# Patient Record
Sex: MALE | Race: BLACK OR AFRICAN AMERICAN | NOT HISPANIC OR LATINO | Employment: OTHER | URBAN - METROPOLITAN AREA
[De-identification: names, ages, dates, MRNs, and addresses within clinical notes are randomized per-mention and may not be internally consistent; named-entity substitution may affect disease eponyms.]

---

## 2020-10-18 ENCOUNTER — APPOINTMENT (OUTPATIENT)
Dept: GENERAL RADIOLOGY | Facility: HOSPITAL | Age: 70
End: 2020-10-18

## 2020-10-18 ENCOUNTER — HOSPITAL ENCOUNTER (INPATIENT)
Facility: HOSPITAL | Age: 70
LOS: 3 days | Discharge: NURSING, REHAB OR LONG-TERM CARE | End: 2020-10-21
Attending: EMERGENCY MEDICINE | Admitting: INTERNAL MEDICINE

## 2020-10-18 DIAGNOSIS — I50.9 ACUTE CONGESTIVE HEART FAILURE, UNSPECIFIED HEART FAILURE TYPE (HCC): Primary | ICD-10-CM

## 2020-10-18 DIAGNOSIS — J96.01 ACUTE RESPIRATORY FAILURE WITH HYPOXIA (HCC): ICD-10-CM

## 2020-10-18 PROBLEM — R09.02 HYPOXIA: Status: ACTIVE | Noted: 2020-10-18

## 2020-10-18 PROBLEM — U07.1 PNEUMONIA DUE TO COVID-19 VIRUS: Status: ACTIVE | Noted: 2020-10-18

## 2020-10-18 PROBLEM — J12.82 PNEUMONIA DUE TO COVID-19 VIRUS: Status: ACTIVE | Noted: 2020-10-18

## 2020-10-18 LAB
ALBUMIN SERPL-MCNC: 4.3 G/DL (ref 3.5–5.2)
ALBUMIN/GLOB SERPL: 1.3 G/DL
ALP SERPL-CCNC: 153 U/L (ref 39–117)
ALT SERPL W P-5'-P-CCNC: 32 U/L (ref 1–41)
ANION GAP SERPL CALCULATED.3IONS-SCNC: 8.6 MMOL/L (ref 5–15)
AST SERPL-CCNC: 16 U/L (ref 1–40)
B PARAPERT DNA SPEC QL NAA+PROBE: NOT DETECTED
B PERT DNA SPEC QL NAA+PROBE: NOT DETECTED
BASOPHILS # BLD AUTO: 0.04 10*3/MM3 (ref 0–0.2)
BASOPHILS NFR BLD AUTO: 0.5 % (ref 0–1.5)
BILIRUB SERPL-MCNC: 1.1 MG/DL (ref 0–1.2)
BUN SERPL-MCNC: 20 MG/DL (ref 8–23)
BUN/CREAT SERPL: 20.4 (ref 7–25)
C PNEUM DNA NPH QL NAA+NON-PROBE: NOT DETECTED
CALCIUM SPEC-SCNC: 9.8 MG/DL (ref 8.6–10.5)
CHLORIDE SERPL-SCNC: 103 MMOL/L (ref 98–107)
CO2 SERPL-SCNC: 26.4 MMOL/L (ref 22–29)
CREAT SERPL-MCNC: 0.98 MG/DL (ref 0.76–1.27)
D DIMER PPP FEU-MCNC: 0.57 MCGFEU/ML (ref 0–0.49)
D-LACTATE SERPL-SCNC: 1.7 MMOL/L (ref 0.5–2)
DEPRECATED RDW RBC AUTO: 42.9 FL (ref 37–54)
EOSINOPHIL # BLD AUTO: 0.1 10*3/MM3 (ref 0–0.4)
EOSINOPHIL NFR BLD AUTO: 1.4 % (ref 0.3–6.2)
ERYTHROCYTE [DISTWIDTH] IN BLOOD BY AUTOMATED COUNT: 14 % (ref 12.3–15.4)
FERRITIN SERPL-MCNC: 286 NG/ML (ref 30–400)
FLUAV H1 2009 PAND RNA NPH QL NAA+PROBE: NOT DETECTED
FLUAV H1 HA GENE NPH QL NAA+PROBE: NOT DETECTED
FLUAV H3 RNA NPH QL NAA+PROBE: NOT DETECTED
FLUAV SUBTYP SPEC NAA+PROBE: NOT DETECTED
FLUBV RNA ISLT QL NAA+PROBE: NOT DETECTED
GFR SERPL CREATININE-BSD FRML MDRD: 92 ML/MIN/1.73
GLOBULIN UR ELPH-MCNC: 3.3 GM/DL
GLUCOSE SERPL-MCNC: 114 MG/DL (ref 65–99)
HADV DNA SPEC NAA+PROBE: NOT DETECTED
HCOV 229E RNA SPEC QL NAA+PROBE: NOT DETECTED
HCOV HKU1 RNA SPEC QL NAA+PROBE: NOT DETECTED
HCOV NL63 RNA SPEC QL NAA+PROBE: NOT DETECTED
HCOV OC43 RNA SPEC QL NAA+PROBE: NOT DETECTED
HCT VFR BLD AUTO: 42.6 % (ref 37.5–51)
HGB BLD-MCNC: 14.3 G/DL (ref 13–17.7)
HMPV RNA NPH QL NAA+NON-PROBE: NOT DETECTED
HPIV1 RNA SPEC QL NAA+PROBE: NOT DETECTED
HPIV2 RNA SPEC QL NAA+PROBE: NOT DETECTED
HPIV3 RNA NPH QL NAA+PROBE: NOT DETECTED
HPIV4 P GENE NPH QL NAA+PROBE: NOT DETECTED
IMM GRANULOCYTES # BLD AUTO: 0.03 10*3/MM3 (ref 0–0.05)
IMM GRANULOCYTES NFR BLD AUTO: 0.4 % (ref 0–0.5)
LDH SERPL-CCNC: 168 U/L (ref 135–225)
LYMPHOCYTES # BLD AUTO: 0.96 10*3/MM3 (ref 0.7–3.1)
LYMPHOCYTES NFR BLD AUTO: 13.2 % (ref 19.6–45.3)
M PNEUMO IGG SER IA-ACNC: NOT DETECTED
MCH RBC QN AUTO: 28.5 PG (ref 26.6–33)
MCHC RBC AUTO-ENTMCNC: 33.6 G/DL (ref 31.5–35.7)
MCV RBC AUTO: 84.9 FL (ref 79–97)
MONOCYTES # BLD AUTO: 0.65 10*3/MM3 (ref 0.1–0.9)
MONOCYTES NFR BLD AUTO: 8.9 % (ref 5–12)
NEUTROPHILS NFR BLD AUTO: 5.52 10*3/MM3 (ref 1.7–7)
NEUTROPHILS NFR BLD AUTO: 75.6 % (ref 42.7–76)
NRBC BLD AUTO-RTO: 0 /100 WBC (ref 0–0.2)
NT-PROBNP SERPL-MCNC: 4428 PG/ML (ref 0–900)
PLATELET # BLD AUTO: 265 10*3/MM3 (ref 140–450)
PMV BLD AUTO: 9.7 FL (ref 6–12)
POTASSIUM SERPL-SCNC: 4.3 MMOL/L (ref 3.5–5.2)
PROCALCITONIN SERPL-MCNC: 0.06 NG/ML (ref 0–0.25)
PROT SERPL-MCNC: 7.6 G/DL (ref 6–8.5)
RBC # BLD AUTO: 5.02 10*6/MM3 (ref 4.14–5.8)
RHINOVIRUS RNA SPEC NAA+PROBE: NOT DETECTED
RSV RNA NPH QL NAA+NON-PROBE: NOT DETECTED
SARS-COV-2 RNA NPH QL NAA+NON-PROBE: DETECTED
SODIUM SERPL-SCNC: 138 MMOL/L (ref 136–145)
TROPONIN T SERPL-MCNC: 0.01 NG/ML (ref 0–0.03)
WBC # BLD AUTO: 7.3 10*3/MM3 (ref 3.4–10.8)

## 2020-10-18 PROCEDURE — 84484 ASSAY OF TROPONIN QUANT: CPT | Performed by: EMERGENCY MEDICINE

## 2020-10-18 PROCEDURE — 25010000002 ENOXAPARIN PER 10 MG: Performed by: INTERNAL MEDICINE

## 2020-10-18 PROCEDURE — 87899 AGENT NOS ASSAY W/OPTIC: CPT | Performed by: INTERNAL MEDICINE

## 2020-10-18 PROCEDURE — 93005 ELECTROCARDIOGRAM TRACING: CPT | Performed by: EMERGENCY MEDICINE

## 2020-10-18 PROCEDURE — 25010000002 FUROSEMIDE PER 20 MG: Performed by: EMERGENCY MEDICINE

## 2020-10-18 PROCEDURE — 0202U NFCT DS 22 TRGT SARS-COV-2: CPT | Performed by: EMERGENCY MEDICINE

## 2020-10-18 PROCEDURE — 71045 X-RAY EXAM CHEST 1 VIEW: CPT

## 2020-10-18 PROCEDURE — 99285 EMERGENCY DEPT VISIT HI MDM: CPT

## 2020-10-18 PROCEDURE — 83615 LACTATE (LD) (LDH) ENZYME: CPT | Performed by: INTERNAL MEDICINE

## 2020-10-18 PROCEDURE — 83605 ASSAY OF LACTIC ACID: CPT | Performed by: EMERGENCY MEDICINE

## 2020-10-18 PROCEDURE — 84145 PROCALCITONIN (PCT): CPT | Performed by: EMERGENCY MEDICINE

## 2020-10-18 PROCEDURE — 85379 FIBRIN DEGRADATION QUANT: CPT | Performed by: INTERNAL MEDICINE

## 2020-10-18 PROCEDURE — 82728 ASSAY OF FERRITIN: CPT | Performed by: INTERNAL MEDICINE

## 2020-10-18 PROCEDURE — 85025 COMPLETE CBC W/AUTO DIFF WBC: CPT | Performed by: EMERGENCY MEDICINE

## 2020-10-18 PROCEDURE — 93010 ELECTROCARDIOGRAM REPORT: CPT | Performed by: INTERNAL MEDICINE

## 2020-10-18 PROCEDURE — 80053 COMPREHEN METABOLIC PANEL: CPT | Performed by: EMERGENCY MEDICINE

## 2020-10-18 PROCEDURE — 83880 ASSAY OF NATRIURETIC PEPTIDE: CPT | Performed by: EMERGENCY MEDICINE

## 2020-10-18 RX ORDER — ATORVASTATIN CALCIUM 40 MG/1
40 TABLET, FILM COATED ORAL NIGHTLY
COMMUNITY

## 2020-10-18 RX ORDER — ONDANSETRON 2 MG/ML
4 INJECTION INTRAMUSCULAR; INTRAVENOUS EVERY 6 HOURS PRN
Status: DISCONTINUED | OUTPATIENT
Start: 2020-10-18 | End: 2020-10-21 | Stop reason: HOSPADM

## 2020-10-18 RX ORDER — AMANTADINE HYDROCHLORIDE 100 MG/1
150 CAPSULE, GELATIN COATED ORAL
COMMUNITY

## 2020-10-18 RX ORDER — FUROSEMIDE 10 MG/ML
40 INJECTION INTRAMUSCULAR; INTRAVENOUS DAILY
Status: DISCONTINUED | OUTPATIENT
Start: 2020-10-19 | End: 2020-10-21

## 2020-10-18 RX ORDER — SODIUM CHLORIDE 0.9 % (FLUSH) 0.9 %
10 SYRINGE (ML) INJECTION AS NEEDED
Status: DISCONTINUED | OUTPATIENT
Start: 2020-10-18 | End: 2020-10-21 | Stop reason: HOSPADM

## 2020-10-18 RX ORDER — ACETAMINOPHEN 325 MG/1
650 TABLET ORAL EVERY 6 HOURS PRN
COMMUNITY

## 2020-10-18 RX ORDER — TIZANIDINE 4 MG/1
2 TABLET ORAL 3 TIMES DAILY
COMMUNITY

## 2020-10-18 RX ORDER — ACETAMINOPHEN 650 MG/1
650 SUPPOSITORY RECTAL EVERY 4 HOURS PRN
Status: DISCONTINUED | OUTPATIENT
Start: 2020-10-18 | End: 2020-10-21 | Stop reason: HOSPADM

## 2020-10-18 RX ORDER — CARVEDILOL 6.25 MG/1
6.25 TABLET ORAL 2 TIMES DAILY WITH MEALS
COMMUNITY

## 2020-10-18 RX ORDER — CLONIDINE HYDROCHLORIDE 0.1 MG/1
0.1 TABLET ORAL EVERY 6 HOURS PRN
COMMUNITY

## 2020-10-18 RX ORDER — ACETAMINOPHEN 325 MG/1
650 TABLET ORAL EVERY 4 HOURS PRN
Status: DISCONTINUED | OUTPATIENT
Start: 2020-10-18 | End: 2020-10-21 | Stop reason: HOSPADM

## 2020-10-18 RX ORDER — TAMSULOSIN HYDROCHLORIDE 0.4 MG/1
1 CAPSULE ORAL DAILY
COMMUNITY

## 2020-10-18 RX ORDER — SODIUM CHLORIDE 0.9 % (FLUSH) 0.9 %
10 SYRINGE (ML) INJECTION EVERY 12 HOURS SCHEDULED
Status: DISCONTINUED | OUTPATIENT
Start: 2020-10-18 | End: 2020-10-21 | Stop reason: HOSPADM

## 2020-10-18 RX ORDER — METHYLPHENIDATE HYDROCHLORIDE 5 MG/1
5 TABLET ORAL 2 TIMES DAILY
Status: ON HOLD | COMMUNITY
End: 2020-10-21 | Stop reason: SDUPTHER

## 2020-10-18 RX ORDER — AMOXICILLIN 250 MG
1 CAPSULE ORAL DAILY
COMMUNITY

## 2020-10-18 RX ORDER — FLUOXETINE HYDROCHLORIDE 20 MG/1
20 CAPSULE ORAL DAILY
COMMUNITY

## 2020-10-18 RX ORDER — HYDRALAZINE HYDROCHLORIDE 50 MG/1
50 TABLET, FILM COATED ORAL 3 TIMES DAILY
COMMUNITY

## 2020-10-18 RX ORDER — FUROSEMIDE 20 MG/1
10 TABLET ORAL DAILY
COMMUNITY
End: 2020-10-21 | Stop reason: HOSPADM

## 2020-10-18 RX ORDER — PANTOPRAZOLE SODIUM 40 MG/1
40 TABLET, DELAYED RELEASE ORAL DAILY
COMMUNITY

## 2020-10-18 RX ORDER — CHOLECALCIFEROL (VITAMIN D3) 125 MCG
50000 TABLET ORAL WEEKLY
COMMUNITY

## 2020-10-18 RX ORDER — ACETAMINOPHEN 160 MG/5ML
650 SOLUTION ORAL EVERY 4 HOURS PRN
Status: DISCONTINUED | OUTPATIENT
Start: 2020-10-18 | End: 2020-10-21 | Stop reason: HOSPADM

## 2020-10-18 RX ORDER — LISINOPRIL 20 MG/1
20 TABLET ORAL DAILY
COMMUNITY

## 2020-10-18 RX ORDER — AMANTADINE HYDROCHLORIDE 100 MG/1
200 CAPSULE, GELATIN COATED ORAL EVERY MORNING
COMMUNITY

## 2020-10-18 RX ORDER — NITROGLYCERIN 0.4 MG/1
0.4 TABLET SUBLINGUAL
Status: DISCONTINUED | OUTPATIENT
Start: 2020-10-18 | End: 2020-10-21 | Stop reason: HOSPADM

## 2020-10-18 RX ORDER — FUROSEMIDE 10 MG/ML
40 INJECTION INTRAMUSCULAR; INTRAVENOUS ONCE
Status: COMPLETED | OUTPATIENT
Start: 2020-10-18 | End: 2020-10-18

## 2020-10-18 RX ORDER — CLOPIDOGREL BISULFATE 75 MG/1
75 TABLET ORAL DAILY
COMMUNITY

## 2020-10-18 RX ADMIN — FUROSEMIDE 40 MG: 20 INJECTION, SOLUTION INTRAMUSCULAR; INTRAVENOUS at 18:45

## 2020-10-18 RX ADMIN — ENOXAPARIN SODIUM 40 MG: 40 INJECTION SUBCUTANEOUS at 22:31

## 2020-10-18 RX ADMIN — SODIUM CHLORIDE, PRESERVATIVE FREE 10 ML: 5 INJECTION INTRAVENOUS at 22:31

## 2020-10-18 RX ADMIN — NITROGLYCERIN 1 INCH: 20 OINTMENT TOPICAL at 23:54

## 2020-10-18 RX ADMIN — NITROGLYCERIN 1 INCH: 20 OINTMENT TOPICAL at 18:45

## 2020-10-18 NOTE — ED NOTES
This RN is wearing mask, gloves and goggles at all times during all patient interactions.     Tyrone Troy RN  10/18/20 1932

## 2020-10-18 NOTE — ED NOTES
Pt's daughter called prior to arrival to ask for updates as they are available. She is his POA and her name is Chyna Dominguez [phone].     Nando Stinson  10/18/20 7562

## 2020-10-18 NOTE — ED PROVIDER NOTES
EMERGENCY DEPARTMENT ENCOUNTER    Room Number:  N646/1  Date of encounter:  10/18/2020  PCP: Anjel Nguyen MD  Historian: EMS and nursing home records    I used full protective equipment while examining this patient.  This includes face mask, gloves and protective eyewear.  I washed my hands before entering the room and immediately upon leaving the room.  Patient was wearing a surgical mask.      HPI:  Chief Complaint: Low O2 sats  A complete HPI/ROS/PMH/PSH/SH/FH are unobtainable due to: Patient has aphasia    Context: Blaze Mayorga is a 70 y.o. male who presents to the ED from the nursing home with reports of oxygen saturation of 86% on room air earlier today.  EMS states that the patient sats increased to 98% on 2 L.  Patient has aphasia and cannot provide any history.      PAST MEDICAL HISTORY  Active Ambulatory Problems     Diagnosis Date Noted   • No Active Ambulatory Problems     Resolved Ambulatory Problems     Diagnosis Date Noted   • No Resolved Ambulatory Problems     Past Medical History:   Diagnosis Date   • A-fib (CMS/[facility])    • Aphasia    • Apraxia    • BPH (benign prostatic hyperplasia)    • COPD (chronic obstructive pulmonary disease) (CMS/[facility])    • Diabetes mellitus (CMS/[facility])    • Dysphagia    • Hemiplegia (CMS/[facility])    • Hypertension    • Major depressive disorder    • Neuromuscular dysfunction of bladder, unspecified    • Stroke (CMS/[facility]) 06/02/2020   • Subarachnoid hemorrhage (CMS/[facility])          PAST SURGICAL HISTORY  Past Surgical History:   Procedure Laterality Date   • PEG TUBE INSERTION           FAMILY HISTORY  History reviewed. No pertinent family history.      SOCIAL HISTORY  Social History     Socioeconomic History   • Marital status:      Spouse name: Not on file   • Number of children: Not on file   • Years of education: Not on file   • Highest education level: Not on file   Tobacco Use   • Smoking status: Never Smoker   • Tobacco comment: aphasic   Substance and Sexual Activity    • Sexual activity: Defer         ALLERGIES  Patient has no known allergies.       REVIEW OF SYSTEMS  Review of Systems   Unobtainable      PHYSICAL EXAM    I have reviewed the triage vital signs and nursing notes.    ED Triage Vitals [10/18/20 1609]   Temp Heart Rate Resp BP SpO2   -- 60 20 160/99 99 %      Temp src Heart Rate Source Patient Position BP Location FiO2 (%)   -- Monitor -- -- --       Physical Exam  GENERAL: Awake, alert, no acute distress  HENT: NCAT, nares patent, moist mucous membranes  NECK: supple, no lymphadenopathy  EYES: no scleral icterus  CV: regular rhythm, regular rate, no murmur  RESPIRATORY: normal effort, clear to auscultation bilaterally except for faint rhonchi in both lung bases  ABDOMEN: soft, nontender, nondistended  MUSCULOSKELETAL: Extremities are nontender and without obvious deformity.  Trace pedal edema bilaterally.  There is no calf tenderness.  NEURO: There is chronic right-sided weakness.  Patient follows commands.  He is aphasic and speech cannot be understood.  SKIN: warm, dry, no rash        LAB RESULTS  Recent Results (from the past 24 hour(s))   Respiratory Panel PCR w/COVID-19(SARS-CoV-2) [facility] In-House, NP Swab in UTM/VTM, 3-4 HR TAT - Swab, Nasopharynx    Collection Time: 10/18/20  5:36 PM    Specimen: Nasopharynx; Swab   Result Value Ref Range    ADENOVIRUS, PCR Not Detected Not Detected    Coronavirus 229E Not Detected Not Detected    Coronavirus HKU1 Not Detected Not Detected    Coronavirus NL63 Not Detected Not Detected    Coronavirus OC43 Not Detected Not Detected    COVID19 Detected (C) Not Detected - Ref. Range    Human Metapneumovirus Not Detected Not Detected    Human Rhinovirus/Enterovirus Not Detected Not Detected    Influenza A PCR Not Detected Not Detected    Influenza A H1 Not Detected Not Detected    Influenza A H1 2009 PCR Not Detected Not Detected    Influenza A H3 Not Detected Not Detected    Influenza B PCR Not Detected Not  Detected    Parainfluenza Virus 1 Not Detected Not Detected    Parainfluenza Virus 2 Not Detected Not Detected    Parainfluenza Virus 3 Not Detected Not Detected    Parainfluenza Virus 4 Not Detected Not Detected    RSV, PCR Not Detected Not Detected    Bordetella pertussis pcr Not Detected Not Detected    Bordetella parapertussis PCR Not Detected Not Detected    Chlamydophila pneumoniae PCR Not Detected Not Detected    Mycoplasma pneumo by PCR Not Detected Not Detected   Comprehensive Metabolic Panel    Collection Time: 10/18/20  5:45 PM    Specimen: Arm, Left; Blood   Result Value Ref Range    Glucose 114 (H) 65 - 99 mg/dL    BUN 20 8 - 23 mg/dL    Creatinine 0.98 0.76 - 1.27 mg/dL    Sodium 138 136 - 145 mmol/L    Potassium 4.3 3.5 - 5.2 mmol/L    Chloride 103 98 - 107 mmol/L    CO2 26.4 22.0 - 29.0 mmol/L    Calcium 9.8 8.6 - 10.5 mg/dL    Total Protein 7.6 6.0 - 8.5 g/dL    Albumin 4.30 3.50 - 5.20 g/dL    ALT (SGPT) 32 1 - 41 U/L    AST (SGOT) 16 1 - 40 U/L    Alkaline Phosphatase 153 (H) 39 - 117 U/L    Total Bilirubin 1.1 0.0 - 1.2 mg/dL    eGFR  African Amer 92 >60 mL/min/1.73    Globulin 3.3 gm/dL    A/G Ratio 1.3 g/dL    BUN/Creatinine Ratio 20.4 7.0 - 25.0    Anion Gap 8.6 5.0 - 15.0 mmol/L   BNP    Collection Time: 10/18/20  5:45 PM    Specimen: Arm, Left; Blood   Result Value Ref Range    proBNP 4,428.0 (H) 0.0 - 900.0 pg/mL   Troponin    Collection Time: 10/18/20  5:45 PM    Specimen: Arm, Left; Blood   Result Value Ref Range    Troponin T 0.015 0.000 - 0.030 ng/mL   Lactic Acid, Plasma    Collection Time: 10/18/20  5:45 PM    Specimen: Arm, Left; Blood   Result Value Ref Range    Lactate 1.7 0.5 - 2.0 mmol/L   Procalcitonin    Collection Time: 10/18/20  5:45 PM    Specimen: Arm, Left; Blood   Result Value Ref Range    Procalcitonin 0.06 0.00 - 0.25 ng/mL   CBC Auto Differential    Collection Time: 10/18/20  5:45 PM    Specimen: Arm, Left; Blood   Result Value Ref Range    WBC 7.30 3.40 - 10.80  10*3/mm3    RBC 5.02 4.14 - 5.80 10*6/mm3    Hemoglobin 14.3 13.0 - 17.7 g/dL    Hematocrit 42.6 37.5 - 51.0 %    MCV 84.9 79.0 - 97.0 fL    MCH 28.5 26.6 - 33.0 pg    MCHC 33.6 31.5 - 35.7 g/dL    RDW 14.0 12.3 - 15.4 %    RDW-SD 42.9 37.0 - 54.0 fl    MPV 9.7 6.0 - 12.0 fL    Platelets 265 140 - 450 10*3/mm3    Neutrophil % 75.6 42.7 - 76.0 %    Lymphocyte % 13.2 (L) 19.6 - 45.3 %    Monocyte % 8.9 5.0 - 12.0 %    Eosinophil % 1.4 0.3 - 6.2 %    Basophil % 0.5 0.0 - 1.5 %    Immature Grans % 0.4 0.0 - 0.5 %    Neutrophils, Absolute 5.52 1.70 - 7.00 10*3/mm3    Lymphocytes, Absolute 0.96 0.70 - 3.10 10*3/mm3    Monocytes, Absolute 0.65 0.10 - 0.90 10*3/mm3    Eosinophils, Absolute 0.10 0.00 - 0.40 10*3/mm3    Basophils, Absolute 0.04 0.00 - 0.20 10*3/mm3    Immature Grans, Absolute 0.03 0.00 - 0.05 10*3/mm3    nRBC 0.0 0.0 - 0.2 /100 WBC   Ferritin    Collection Time: 10/18/20  5:45 PM    Specimen: Arm, Left; Blood   Result Value Ref Range    Ferritin 286.00 30.00 - 400.00 ng/mL   Lactate Dehydrogenase    Collection Time: 10/18/20  5:45 PM    Specimen: Arm, Left; Blood   Result Value Ref Range     135 - 225 U/L       Ordered the above labs and independently reviewed the results.      RADIOLOGY  Xr Chest 1 View    Result Date: 10/18/2020  XR CHEST 1 VW-  10/18/2020  HISTORY: Shortness of breath.  Heart size is mildly enlarged. There is bilateral vascular congestion with some mild interstitial fluid. More confluent increased density is seen in the right lung base which may represent edema and/or pneumonia.  Small metallic density seen overlying the right mid hemithorax. Calcified left hilar lymph node is seen.  No pneumothorax is seen.        FINDINGS consistent with congestive heart failure. There could be an element of pneumonia in the right lung base. Please correlate. 2. Follow-up films recommended.  This report was finalized on 10/18/2020 6:30 PM by Dr. Moshe Terry M.D.        I ordered the above  noted radiological studies. Reviewed by me and discussed with radiologist.  See dictation for official radiology interpretation.      PROCEDURES  Procedures      MEDICATIONS GIVEN IN ER    Medications   sodium chloride 0.9 % flush 10 mL (has no administration in time range)   furosemide (LASIX) injection 40 mg (has no administration in time range)   nitroglycerin (NITROSTAT) ointment 1 inch (has no administration in time range)   sodium chloride 0.9 % flush 10 mL (has no administration in time range)   sodium chloride 0.9 % flush 10 mL (has no administration in time range)   enoxaparin (LOVENOX) syringe 40 mg (has no administration in time range)   nitroglycerin (NITROSTAT) SL tablet 0.4 mg (has no administration in time range)   acetaminophen (TYLENOL) tablet 650 mg (has no administration in time range)     Or   acetaminophen (TYLENOL) 160 MG/5ML solution 650 mg (has no administration in time range)     Or   acetaminophen (TYLENOL) suppository 650 mg (has no administration in time range)   ondansetron (ZOFRAN) injection 4 mg (has no administration in time range)   furosemide (LASIX) injection 40 mg (40 mg Intravenous Given 10/18/20 1845)   nitroglycerin (NITROSTAT) ointment 1 inch (1 inch Topical Given 10/18/20 1845)         PROGRESS, DATA ANALYSIS, CONSULTS, AND MEDICAL DECISION MAKING    All labs have been independently reviewed by me.  All radiology studies have been reviewed by me and discussed with radiologist dictating the report.   EKG's independently viewed and interpreted by me.  I have reviewed the nurse's notes, vital signs, past medical history, and medication list.  Discussion below represents my analysis of pertinent findings related to patient's condition, differential diagnosis, treatment plan and final disposition.      Differential diagnosis includes but is not limited to CHF, acute coronary syndrome, pulmonary embolism, pneumothorax, pneumonia, asthma/COPD, deconditioning, anemia, anxiety.          ED Course as of Oct 18 2208   Stanley Oct 18, 2020   1718 Additional history was obtained from patient's daughter, Chyna.  She states that the patient does not have any underlying lung problems and is not on supplemental oxygen.  He does have a history of A. fib.  Patient saw his PCP last week for a routine checkup.  Patient has not had any recent cough s or fever as far she knows.  H he reportedly tested negative for COVID-19 within the past 1 to 2 weeks.  E reports that his nurse called today and stated that he seemed short of breath and his O2 sats were in the 80s.    []   1835 EKG          EKG time: 1812  Rhythm/Rate: Atrial fibrillation with PVC, rate 63  P waves and HI: Irregular, varying  QRS, axis: LBBB, LAD  ST and T waves: Nonspecific T wave changes    Interpreted Contemporaneously by me, independently viewed  No prior available for comparison      []   1837 Chest x-ray shows signs of CHF.  proBNP is elevated.  Patient will be given IV Lasix and Nitropaste.    []   1900 Case discussed with Dr. Gan and he agrees to admit the patient to monitored bed.  Pertinent exam findings, test results, and ED course were discussed with him.    []      ED Course User Index  [] Thomas Blair MD       AS OF 22:08 EDT VITALS:    BP - (!) 132/104  HR - 62  TEMP - 98.9 °F (37.2 °C) (Oral)  O2 SATS - 99%      DIAGNOSIS  Final diagnoses:   Acute congestive heart failure, unspecified heart failure type (CMS/HCC)   Acute respiratory failure with hypoxia (CMS/HCC)         DISPOSITION  Admission    ADMISSION    Discussed treatment plan and reason for admission with pt/family and admitting physician.  Pt/family voiced understanding of the plan for admission for further testing/treatment as needed.         Dictated utilizing Dragon dictation:  Much of this encounter note is an electronic transcription/translation of spoken language to printed text. The electronic translation of spoken language may permit  erroneous, or at times, nonsensical words or phrases to be inadvertently transcribed; Although I have reviewed the note for such errors, some may still exist.     Thomas Blair MD  10/18/20 9017

## 2020-10-18 NOTE — ED TRIAGE NOTES
Patient presents to er via ems from [facility].  Nursing home reports 86% on room air.  Patient currently on 2 liters and saturating 98%.  Patient was placed in face mask during first look triage.  Patient was wearing a face mask throughout encounter.  I wore personal protective equipment throughout the encounter.  Hand hygiene was performed before and after patient encounter.

## 2020-10-19 ENCOUNTER — APPOINTMENT (OUTPATIENT)
Dept: CARDIOLOGY | Facility: HOSPITAL | Age: 70
End: 2020-10-19

## 2020-10-19 PROBLEM — I50.33 ACUTE ON CHRONIC DIASTOLIC CONGESTIVE HEART FAILURE (HCC): Status: ACTIVE | Noted: 2020-10-18

## 2020-10-19 PROBLEM — E11.65 TYPE 2 DIABETES MELLITUS WITH HYPERGLYCEMIA (HCC): Status: ACTIVE | Noted: 2020-10-19

## 2020-10-19 LAB
ALBUMIN SERPL-MCNC: 4 G/DL (ref 3.5–5.2)
ALBUMIN/GLOB SERPL: 1.2 G/DL
ALP SERPL-CCNC: 134 U/L (ref 39–117)
ALT SERPL W P-5'-P-CCNC: 24 U/L (ref 1–41)
ANION GAP SERPL CALCULATED.3IONS-SCNC: 12.8 MMOL/L (ref 5–15)
AST SERPL-CCNC: 15 U/L (ref 1–40)
BASOPHILS # BLD AUTO: 0.04 10*3/MM3 (ref 0–0.2)
BASOPHILS NFR BLD AUTO: 0.8 % (ref 0–1.5)
BILIRUB SERPL-MCNC: 1.3 MG/DL (ref 0–1.2)
BUN SERPL-MCNC: 16 MG/DL (ref 8–23)
BUN/CREAT SERPL: 15.8 (ref 7–25)
CALCIUM SPEC-SCNC: 9.4 MG/DL (ref 8.6–10.5)
CHLORIDE SERPL-SCNC: 104 MMOL/L (ref 98–107)
CK SERPL-CCNC: 37 U/L (ref 20–200)
CO2 SERPL-SCNC: 23.2 MMOL/L (ref 22–29)
CREAT SERPL-MCNC: 1.01 MG/DL (ref 0.76–1.27)
CRP SERPL-MCNC: 4.24 MG/DL (ref 0–0.5)
D DIMER PPP FEU-MCNC: 0.52 MCGFEU/ML (ref 0–0.49)
D-LACTATE SERPL-SCNC: 1.5 MMOL/L (ref 0.5–2)
DEPRECATED RDW RBC AUTO: 43 FL (ref 37–54)
EOSINOPHIL # BLD AUTO: 0.03 10*3/MM3 (ref 0–0.4)
EOSINOPHIL NFR BLD AUTO: 0.6 % (ref 0.3–6.2)
ERYTHROCYTE [DISTWIDTH] IN BLOOD BY AUTOMATED COUNT: 13.9 % (ref 12.3–15.4)
FERRITIN SERPL-MCNC: 282 NG/ML (ref 30–400)
GFR SERPL CREATININE-BSD FRML MDRD: 89 ML/MIN/1.73
GLOBULIN UR ELPH-MCNC: 3.4 GM/DL
GLUCOSE BLDC GLUCOMTR-MCNC: 104 MG/DL (ref 70–130)
GLUCOSE BLDC GLUCOMTR-MCNC: 109 MG/DL (ref 70–130)
GLUCOSE BLDC GLUCOMTR-MCNC: 124 MG/DL (ref 70–130)
GLUCOSE BLDC GLUCOMTR-MCNC: 134 MG/DL (ref 70–130)
GLUCOSE SERPL-MCNC: 104 MG/DL (ref 65–99)
HCT VFR BLD AUTO: 40.5 % (ref 37.5–51)
HGB BLD-MCNC: 13.3 G/DL (ref 13–17.7)
IMM GRANULOCYTES # BLD AUTO: 0.02 10*3/MM3 (ref 0–0.05)
IMM GRANULOCYTES NFR BLD AUTO: 0.4 % (ref 0–0.5)
L PNEUMO1 AG UR QL IA: NEGATIVE
LDH SERPL-CCNC: 149 U/L (ref 135–225)
LYMPHOCYTES # BLD AUTO: 0.64 10*3/MM3 (ref 0.7–3.1)
LYMPHOCYTES NFR BLD AUTO: 12.5 % (ref 19.6–45.3)
MAXIMAL PREDICTED HEART RATE: 151 BPM
MCH RBC QN AUTO: 27.9 PG (ref 26.6–33)
MCHC RBC AUTO-ENTMCNC: 32.8 G/DL (ref 31.5–35.7)
MCV RBC AUTO: 85.1 FL (ref 79–97)
MONOCYTES # BLD AUTO: 0.72 10*3/MM3 (ref 0.1–0.9)
MONOCYTES NFR BLD AUTO: 14.1 % (ref 5–12)
NEUTROPHILS NFR BLD AUTO: 3.65 10*3/MM3 (ref 1.7–7)
NEUTROPHILS NFR BLD AUTO: 71.6 % (ref 42.7–76)
NRBC BLD AUTO-RTO: 0 /100 WBC (ref 0–0.2)
PLATELET # BLD AUTO: 256 10*3/MM3 (ref 140–450)
PMV BLD AUTO: 9.8 FL (ref 6–12)
POTASSIUM SERPL-SCNC: 3.6 MMOL/L (ref 3.5–5.2)
PROCALCITONIN SERPL-MCNC: 0.08 NG/ML (ref 0–0.25)
PROT SERPL-MCNC: 7.4 G/DL (ref 6–8.5)
RBC # BLD AUTO: 4.76 10*6/MM3 (ref 4.14–5.8)
S PNEUM AG SPEC QL LA: NEGATIVE
SODIUM SERPL-SCNC: 140 MMOL/L (ref 136–145)
STRESS TARGET HR: 128 BPM
TROPONIN T SERPL-MCNC: 0.02 NG/ML (ref 0–0.03)
TSH SERPL DL<=0.05 MIU/L-ACNC: 0.96 UIU/ML (ref 0.27–4.2)
WBC # BLD AUTO: 5.1 10*3/MM3 (ref 3.4–10.8)

## 2020-10-19 PROCEDURE — 25010000002 PERFLUTREN (DEFINITY) 8.476 MG IN SODIUM CHLORIDE (PF) 0.9 % 10 ML INJECTION: Performed by: INTERNAL MEDICINE

## 2020-10-19 PROCEDURE — 82962 GLUCOSE BLOOD TEST: CPT

## 2020-10-19 PROCEDURE — 84484 ASSAY OF TROPONIN QUANT: CPT | Performed by: INTERNAL MEDICINE

## 2020-10-19 PROCEDURE — 25010000002 ENOXAPARIN PER 10 MG: Performed by: NURSE PRACTITIONER

## 2020-10-19 PROCEDURE — 82550 ASSAY OF CK (CPK): CPT | Performed by: INTERNAL MEDICINE

## 2020-10-19 PROCEDURE — 25010000002 FUROSEMIDE PER 20 MG: Performed by: INTERNAL MEDICINE

## 2020-10-19 PROCEDURE — 86140 C-REACTIVE PROTEIN: CPT | Performed by: INTERNAL MEDICINE

## 2020-10-19 PROCEDURE — 93308 TTE F-UP OR LMTD: CPT | Performed by: INTERNAL MEDICINE

## 2020-10-19 PROCEDURE — 93325 DOPPLER ECHO COLOR FLOW MAPG: CPT

## 2020-10-19 PROCEDURE — 85025 COMPLETE CBC W/AUTO DIFF WBC: CPT | Performed by: INTERNAL MEDICINE

## 2020-10-19 PROCEDURE — 83605 ASSAY OF LACTIC ACID: CPT | Performed by: INTERNAL MEDICINE

## 2020-10-19 PROCEDURE — 80053 COMPREHEN METABOLIC PANEL: CPT | Performed by: INTERNAL MEDICINE

## 2020-10-19 PROCEDURE — 83615 LACTATE (LD) (LDH) ENZYME: CPT | Performed by: INTERNAL MEDICINE

## 2020-10-19 PROCEDURE — 93325 DOPPLER ECHO COLOR FLOW MAPG: CPT | Performed by: INTERNAL MEDICINE

## 2020-10-19 PROCEDURE — 99221 1ST HOSP IP/OBS SF/LOW 40: CPT | Performed by: INTERNAL MEDICINE

## 2020-10-19 PROCEDURE — 93308 TTE F-UP OR LMTD: CPT

## 2020-10-19 PROCEDURE — 84443 ASSAY THYROID STIM HORMONE: CPT | Performed by: INTERNAL MEDICINE

## 2020-10-19 PROCEDURE — 85379 FIBRIN DEGRADATION QUANT: CPT | Performed by: INTERNAL MEDICINE

## 2020-10-19 PROCEDURE — 82728 ASSAY OF FERRITIN: CPT | Performed by: INTERNAL MEDICINE

## 2020-10-19 PROCEDURE — 84145 PROCALCITONIN (PCT): CPT | Performed by: INTERNAL MEDICINE

## 2020-10-19 RX ORDER — CARVEDILOL 6.25 MG/1
6.25 TABLET ORAL 2 TIMES DAILY WITH MEALS
Status: DISCONTINUED | OUTPATIENT
Start: 2020-10-19 | End: 2020-10-21 | Stop reason: HOSPADM

## 2020-10-19 RX ORDER — FLUOXETINE HYDROCHLORIDE 20 MG/5ML
20 LIQUID ORAL DAILY
Status: DISCONTINUED | OUTPATIENT
Start: 2020-10-19 | End: 2020-10-21 | Stop reason: HOSPADM

## 2020-10-19 RX ORDER — ATORVASTATIN CALCIUM 20 MG/1
40 TABLET, FILM COATED ORAL NIGHTLY
Status: DISCONTINUED | OUTPATIENT
Start: 2020-10-19 | End: 2020-10-19

## 2020-10-19 RX ORDER — AMANTADINE HYDROCHLORIDE 100 MG/1
100 CAPSULE, GELATIN COATED ORAL
Status: DISCONTINUED | OUTPATIENT
Start: 2020-10-19 | End: 2020-10-21 | Stop reason: HOSPADM

## 2020-10-19 RX ORDER — NICOTINE POLACRILEX 4 MG
15 LOZENGE BUCCAL
Status: DISCONTINUED | OUTPATIENT
Start: 2020-10-19 | End: 2020-10-21 | Stop reason: HOSPADM

## 2020-10-19 RX ORDER — TAMSULOSIN HYDROCHLORIDE 0.4 MG/1
0.4 CAPSULE ORAL NIGHTLY
Status: DISCONTINUED | OUTPATIENT
Start: 2020-10-19 | End: 2020-10-21 | Stop reason: HOSPADM

## 2020-10-19 RX ORDER — AMOXICILLIN 250 MG
1 CAPSULE ORAL DAILY
Status: DISCONTINUED | OUTPATIENT
Start: 2020-10-19 | End: 2020-10-21 | Stop reason: HOSPADM

## 2020-10-19 RX ORDER — ATORVASTATIN CALCIUM 20 MG/1
40 TABLET, FILM COATED ORAL NIGHTLY
Status: DISCONTINUED | OUTPATIENT
Start: 2020-10-19 | End: 2020-10-21 | Stop reason: HOSPADM

## 2020-10-19 RX ORDER — TIZANIDINE 4 MG/1
2 TABLET ORAL 3 TIMES DAILY
Status: DISCONTINUED | OUTPATIENT
Start: 2020-10-19 | End: 2020-10-21 | Stop reason: HOSPADM

## 2020-10-19 RX ORDER — AMANTADINE HYDROCHLORIDE 100 MG/1
200 CAPSULE, GELATIN COATED ORAL EVERY MORNING
Status: DISCONTINUED | OUTPATIENT
Start: 2020-10-20 | End: 2020-10-21 | Stop reason: HOSPADM

## 2020-10-19 RX ORDER — PANTOPRAZOLE SODIUM 40 MG/1
40 TABLET, DELAYED RELEASE ORAL DAILY
Status: DISCONTINUED | OUTPATIENT
Start: 2020-10-19 | End: 2020-10-21 | Stop reason: HOSPADM

## 2020-10-19 RX ORDER — ACETAMINOPHEN 325 MG/1
650 TABLET ORAL EVERY 6 HOURS PRN
Status: DISCONTINUED | OUTPATIENT
Start: 2020-10-19 | End: 2020-10-21 | Stop reason: HOSPADM

## 2020-10-19 RX ORDER — METHYLPHENIDATE HYDROCHLORIDE 5 MG/1
5 TABLET ORAL 2 TIMES DAILY WITH MEALS
Status: DISCONTINUED | OUTPATIENT
Start: 2020-10-19 | End: 2020-10-21 | Stop reason: HOSPADM

## 2020-10-19 RX ORDER — DEXTROSE MONOHYDRATE 25 G/50ML
25 INJECTION, SOLUTION INTRAVENOUS
Status: DISCONTINUED | OUTPATIENT
Start: 2020-10-19 | End: 2020-10-21 | Stop reason: HOSPADM

## 2020-10-19 RX ORDER — CLOPIDOGREL BISULFATE 75 MG/1
75 TABLET ORAL DAILY
Status: DISCONTINUED | OUTPATIENT
Start: 2020-10-19 | End: 2020-10-21 | Stop reason: HOSPADM

## 2020-10-19 RX ORDER — LISINOPRIL 20 MG/1
20 TABLET ORAL DAILY
Status: DISCONTINUED | OUTPATIENT
Start: 2020-10-19 | End: 2020-10-21 | Stop reason: HOSPADM

## 2020-10-19 RX ORDER — HYDRALAZINE HYDROCHLORIDE 50 MG/1
50 TABLET, FILM COATED ORAL 3 TIMES DAILY
Status: DISCONTINUED | OUTPATIENT
Start: 2020-10-19 | End: 2020-10-21 | Stop reason: HOSPADM

## 2020-10-19 RX ADMIN — PANTOPRAZOLE SODIUM 40 MG: 40 TABLET, DELAYED RELEASE ORAL at 15:22

## 2020-10-19 RX ADMIN — APIXABAN 5 MG: 5 TABLET, FILM COATED ORAL at 20:12

## 2020-10-19 RX ADMIN — CARVEDILOL 6.25 MG: 6.25 TABLET, FILM COATED ORAL at 18:11

## 2020-10-19 RX ADMIN — SODIUM CHLORIDE, PRESERVATIVE FREE 10 ML: 5 INJECTION INTRAVENOUS at 20:12

## 2020-10-19 RX ADMIN — TAMSULOSIN HYDROCHLORIDE 0.4 MG: 0.4 CAPSULE ORAL at 20:12

## 2020-10-19 RX ADMIN — HYDRALAZINE HYDROCHLORIDE 50 MG: 50 TABLET, FILM COATED ORAL at 15:22

## 2020-10-19 RX ADMIN — CARVEDILOL 6.25 MG: 6.25 TABLET, FILM COATED ORAL at 12:10

## 2020-10-19 RX ADMIN — SODIUM CHLORIDE, PRESERVATIVE FREE 10 ML: 5 INJECTION INTRAVENOUS at 08:56

## 2020-10-19 RX ADMIN — DOCUSATE SODIUM 50MG AND SENNOSIDES 8.6MG 1 TABLET: 8.6; 5 TABLET, FILM COATED ORAL at 15:21

## 2020-10-19 RX ADMIN — AMANTADINE HYDROCHLORIDE 100 MG: 100 CAPSULE ORAL at 15:22

## 2020-10-19 RX ADMIN — CLOPIDOGREL 75 MG: 75 TABLET, FILM COATED ORAL at 15:22

## 2020-10-19 RX ADMIN — PERFLUTREN 2 ML: 6.52 INJECTION, SUSPENSION INTRAVENOUS at 16:15

## 2020-10-19 RX ADMIN — HYDRALAZINE HYDROCHLORIDE 50 MG: 50 TABLET, FILM COATED ORAL at 20:12

## 2020-10-19 RX ADMIN — NITROGLYCERIN 1 INCH: 20 OINTMENT TOPICAL at 18:11

## 2020-10-19 RX ADMIN — FUROSEMIDE 40 MG: 10 INJECTION, SOLUTION INTRAMUSCULAR; INTRAVENOUS at 08:55

## 2020-10-19 RX ADMIN — FLUOXETINE 20 MG: 20 SOLUTION ORAL at 15:21

## 2020-10-19 RX ADMIN — NITROGLYCERIN 1 INCH: 20 OINTMENT TOPICAL at 05:59

## 2020-10-19 RX ADMIN — ACETAMINOPHEN 650 MG: 325 TABLET, FILM COATED ORAL at 15:49

## 2020-10-19 RX ADMIN — ENOXAPARIN SODIUM 90 MG: 100 INJECTION SUBCUTANEOUS at 13:01

## 2020-10-19 RX ADMIN — TIZANIDINE 2 MG: 4 TABLET ORAL at 15:21

## 2020-10-19 RX ADMIN — LISINOPRIL 20 MG: 20 TABLET ORAL at 15:21

## 2020-10-19 RX ADMIN — NITROGLYCERIN 1 INCH: 20 OINTMENT TOPICAL at 12:11

## 2020-10-19 RX ADMIN — ATORVASTATIN CALCIUM 40 MG: 20 TABLET, FILM COATED ORAL at 20:11

## 2020-10-19 RX ADMIN — TIZANIDINE 2 MG: 4 TABLET ORAL at 20:12

## 2020-10-19 RX ADMIN — METHYLPHENIDATE HYDROCHLORIDE 5 MG: 5 TABLET ORAL at 18:11

## 2020-10-19 NOTE — PROGRESS NOTES
Pharmacy consult for Lovenox dosing  Blaze Mayorga is a 70 y.o. male 85.5 kg (188 lb 9.6 oz).    Pharmacy consulted to dose per SUZIE Rodriguez  Indication: AFib    Home Anticoagulation: Apixaban 5 mg PO BID  Active Inpatient Anticoagulation Orders: none    Estimated Creatinine Clearance: 75.7 mL/min (by C-G formula based on SCr of 0.98 mg/dL).  Body mass index is 28.68 kg/m².    Creatinine   Date Value Ref Range Status   10/18/2020 0.98 0.76 - 1.27 mg/dL Final     Platelets   Date Value Ref Range Status   10/19/2020 256 140 - 450 10*3/mm3 Final   10/18/2020 265 140 - 450 10*3/mm3 Final     Hemoglobin   Date Value Ref Range Status   10/19/2020 13.3 13.0 - 17.7 g/dL Final   10/18/2020 14.3 13.0 - 17.7 g/dL Final     No results found for: INR      PLAN:  Will start Lovenox 90 mg (1 mg/kg) SQ Q12H.  Pharmacy will continue to follow and adjust as needed.        Thanks,  Waqar Hinds III, PharmD  PGY1 Pharmacy Resident

## 2020-10-19 NOTE — PROGRESS NOTES
Discharge Planning Assessment  [facility]     Patient Name: Blaze Mayorga  MRN: [id number]  Today's Date: 10/19/2020    Admit Date: 10/18/2020    Discharge Needs Assessment     Row Name 10/19/20 1652       Living Environment    Provides Primary Care For  no one, unable/limited ability to care for self    Family Caregiver if Needed  child(kim), adult    Quality of Family Relationships  involved       Resource/Environmental Concerns    Resource/Environmental Concerns  none       Transition Planning    Patient/Family Anticipates Transition to  inpatient rehabilitation facility;long-term care facility    Patient/Family Anticipated Services at Transition  skilled nursing;rehabilitation services       Discharge Needs Assessment    Readmission Within the Last 30 Days  no previous admission in last 30 days    Row Name 10/19/20 1651       Living Environment    Lives With  facility resident        Discharge Plan     Row Name 10/19/20 1653       Plan    Plan  Return to [facility] L[facility]/ SNF    Plan Comments  Reviewed facesheet, pt is from [facility]. Epic referral to Iraida Khoury to f/u w/ LOC/bed status. CCP will confirm dcp w/ pt's daughter Chyna [phone].        Continued Care and Services - Admitted Since 10/18/2020     Destination     Service Provider Request Status Selected Services Address Phone Fax    [facility]  Pending - Request Sent N/A [id number] [id number] [phone]                Demographic Summary    No documentation.       Functional Status    No documentation.       Psychosocial    No documentation.       Abuse/Neglect    No documentation.       Legal    No documentation.       Substance Abuse    No documentation.       Patient Forms    No documentation.           Keyla Gayle RN

## 2020-10-19 NOTE — SIGNIFICANT NOTE
10/19/20 1430   OTHER   Discipline speech language pathologist   Rehab Time/Intention   Session Not Performed other (see comments)  (Discussed with RN. Cardiology to complete testing at bedside. Discussed use of PEG for meds and nutrition this date. SLP to f/u next date for clinical swallow eval as appropriate as patient has PO diet at SNF.)

## 2020-10-19 NOTE — H&P
Internal medicine history and physical  INTERNAL MEDICINE   [facility]       Patient Identification:  Name: Blaze Mayorga  Age: 70 y.o.  Sex: male  :  1950  MRN: [id number]                   Primary Care Physician: Anjel Nguyen MD                               Date of admission:10/18/2020    Chief Complaint: Sent from nursing home for low oxygen saturation noted to be 86% on room air.    History of Present Illness:   Patient is a 70-year-old male with history of CVA with right-sided hemiplegia, dysphagia and PEG tube placement who is a resident of nursing home and unable to give any account of his symptoms was apparently noted to have low oxygen saturation of 86% earlier today.  Is unclear whether his nursing home has current issues with COVID-19 infection and whether he has been exposed to it or not.  Patient has significant expressive aphasia and he is unable to give much account of his symptoms and he mumbles and tries to communicate but only able to make unintelligible sounds.  He is unable to provide any meaningful history.  Work-up in the emergency room did reveal oxygen saturation improved to 98% on room air with BNP of 4428, normal white blood cell count and respiratory viral panel with COVID-19 positive for COVID-19 infection.  Chest x-ray did show evidence of possible pneumonia at the right lung base with generalized fluid overload and vascular congestion.  Patient is being admitted for further care.      Past Medical History:  History reviewed. No pertinent past medical history.  Past Surgical History:  History reviewed. No pertinent surgical history.   Home Meds:  (Not in a hospital admission)    Current Meds:     Current Facility-Administered Medications:   •  [COMPLETED] Insert peripheral IV, , , Once **AND** sodium chloride 0.9 % flush 10 mL, 10 mL, Intravenous, PRN, Thomas Blair MD  No current outpatient medications on file.  Allergies:  No Known Allergies  Social  "History:   Social History     Tobacco Use   • Smoking status: Not on file   Substance Use Topics   • Alcohol use: Not on file      Family History:  No family history on file.       Review of Systems  See history of present illness and past medical history.   Could not be obtained from the patient because of aphasia and previous CVA.      Vitals:   BP (!) 159/115   Pulse 60   Temp 98.9 °F (37.2 °C) (Oral)   Resp 20   Ht 172.7 cm (68\")   Wt 87 kg (191 lb 11.2 oz)   SpO2 99%   BMI 29.15 kg/m²   I/O: No intake or output data in the 24 hours ending 10/18/20 2027  Exam:  Patient is examined using the personal protective equipment as per guidelines from infection control for this particular patient as enacted.  Hand washing was performed before and after patient interaction.  General Appearance:   Awake chronically ill male does not appear to be toxic or septic who attempts to engage but unable to do so because of severe aphasia from prior CVA.   Head:    Normocephalic, without obvious abnormality, atraumatic   Eyes:    PERRL, conjunctiva/corneas clear, EOM's intact, both eyes   Ears:    Normal external ear canals, both ears   Nose:   Nares normal, septum midline, mucosa normal, no drainage    or sinus tenderness   Throat:   Lips, tongue, gums normal; oral mucosa pink and dry   Neck:   Supple, symmetrical, trachea midline, no adenopathy;     thyroid:  no enlargement/tenderness/nodules; no carotid    bruit or JVD   Back:     Symmetric, no curvature, ROM normal, no CVA tenderness   Lungs:    Bilateral air entry no obvious use of accessory muscles of breathing noted   Chest Wall:    No tenderness or deformity    Heart:    Regular rate and rhythm, S1 and S2 normal, no murmur, rub   or gallop   Abdomen:    Soft nontender G-tube in place   Extremities:   Extremities normal, atraumatic, no cyanosis or edema   Pulses:   Pulses palpable in all extremities; symmetric all extremities   Skin:   Skin color normal, Skin is warm " and dry,  no rashes or palpable lesions   Neurologic:  Aphasia or dysarthria and right-sided weakness       Data Review:      I reviewed the patient's new clinical results.  Results from last 7 days   Lab Units 10/18/20  1745   WBC 10*3/mm3 7.30   HEMOGLOBIN g/dL 14.3   PLATELETS 10*3/mm3 265     Results from last 7 days   Lab Units 10/18/20  1745   SODIUM mmol/L 138   POTASSIUM mmol/L 4.3   CHLORIDE mmol/L 103   CO2 mmol/L 26.4   BUN mg/dL 20   CREATININE mg/dL 0.98   CALCIUM mg/dL 9.8   GLUCOSE mg/dL 114*     Microbiology Results (last 10 days)     Procedure Component Value - Date/Time    S. Pneumo Ag Urine or CSF - Urine, Urine, Clean Catch [[id number]]  (Normal) Collected: 10/18/20 2358    Lab Status: Final result Specimen: Urine, Clean Catch Updated: 10/19/20 0229     Strep Pneumo Ag Negative    Legionella Antigen, Urine - Urine, Urine, Clean Catch [[id number]]  (Normal) Collected: 10/18/20 2358    Lab Status: Final result Specimen: Urine, Clean Catch Updated: 10/19/20 0228     LEGIONELLA ANTIGEN, URINE Negative    COVID PRE-OP / PRE-PROCEDURE SCREENING ORDER (NO ISOLATION) - Swab, Nasopharynx [[id number]]  (Abnormal) Collected: 10/18/20 1736    Lab Status: Final result Specimen: Swab from Nasopharynx Updated: 10/18/20 1926    Narrative:      The following orders were created for panel order COVID PRE-OP / PRE-PROCEDURE SCREENING ORDER (NO ISOLATION) - Swab, Nasopharynx.  Procedure                               Abnormality         Status                     ---------                               -----------         ------                     Respiratory Panel PCR w/...[[id number]]  Abnormal            Final result                 Please view results for these tests on the individual orders.    Respiratory Panel PCR w/COVID-19(SARS-CoV-2) [facility] In-House, NP Swab in UTM/VTM, 3-4 HR TAT - Swab, Nasopharynx [[id number]]  (Abnormal) Collected: 10/18/20 1736    Lab Status: Final result Specimen: Swab  from Nasopharynx Updated: 10/18/20 1926     ADENOVIRUS, PCR Not Detected     Coronavirus 229E Not Detected     Coronavirus HKU1 Not Detected     Coronavirus NL63 Not Detected     Coronavirus OC43 Not Detected     COVID19 Detected     Human Metapneumovirus Not Detected     Human Rhinovirus/Enterovirus Not Detected     Influenza A PCR Not Detected     Influenza A H1 Not Detected     Influenza A H1 2009 PCR Not Detected     Influenza A H3 Not Detected     Influenza B PCR Not Detected     Parainfluenza Virus 1 Not Detected     Parainfluenza Virus 2 Not Detected     Parainfluenza Virus 3 Not Detected     Parainfluenza Virus 4 Not Detected     RSV, PCR Not Detected     Bordetella pertussis pcr Not Detected     Bordetella parapertussis PCR Not Detected     Chlamydophila pneumoniae PCR Not Detected     Mycoplasma pneumo by PCR Not Detected    Narrative:      Fact sheet for providers: https://docs.[employer]/wp-content/uploads/UTF9060-3033-MC7.1-EUA-Provider-Fact-Sheet-3.pdf    Fact sheet for patients: https://docs.[employer]/wp-content/uploads/YUX4395-9412-EU8.1-EUA-Patient-Fact-Sheet-1.pdf        ECG 12 Lead   Preliminary Result   HEART RATE= 63  bpm   RR Interval= 956  ms   IN Interval=   ms   P Horizontal Axis=   deg   P Front Axis=   deg   QRSD Interval= 145  ms   QT Interval= 439  ms   QRS Axis= -54  deg   T Wave Axis= 103  deg   - ABNORMAL ECG -   Atrial fibrillation   Ventricular premature complex   Left bundle branch block   Electronically Signed By:    Date and Time of Study: 2020-10-18 18:12:09        XR CHEST 1 VW-  10/18/2020       HISTORY: Shortness of breath.       Heart size is mildly enlarged. There is bilateral vascular congestion   with some mild interstitial fluid. More confluent increased density is   seen in the right lung base which may represent edema and/or pneumonia.       Small metallic density seen overlying the right mid hemithorax.   Calcified left hilar lymph node is seen.       No  pneumothorax is seen.               Impression:     FINDINGS consistent with congestive heart failure. There   could be an element of pneumonia in the right lung base. Please   correlate.   2. Follow-up films recommended.        Assessment:  Active Hospital Problems    Diagnosis POA   • Acute congestive heart failure (CMS/HCC) [I50.9] Yes   • Pneumonia due to COVID-19 virus [U07.1, J12.89] Unknown   • Hypoxia [R09.02] Unknown       Medical decision making:  Hypoxia-multifactorial and could be due to combination of:   - underlying congestive heart failure exacerbation,    - COVID-19 infection or    - evolving pneumonia-at this stage it is easily correctable with oxygen supplementation now requiring very minimal oxygen support and maintaining saturation greater than 96% in the context of abnormal chest x-ray, elevated BNP and positive COVID-19 assay and very limited background medical information in a patient with significant aphasia and prior history of CVA and hypertension.  Plan is to treat him for underlying congestive heart failure exacerbation with diuresis, continue with oxygen supplementation and try to wean him off oxygen.  If he continues to maintain his oxygen saturation greater than 93% on room air then he may not require dexamethasone or experimental treatment for COVID-19 infection.  Continue with Nitropaste to control his blood pressure and gather more information about his underlying medical issues prior to his hospitalization at the nursing home.  Hypertension-continue his antihypertensive regimen via G-tube and monitor.  History of CVA with expressive aphasia continue his nursing home nutrition and feeding program with aspiration precautions.  Patient has feeding tube in place.  Atrial fibrillation noticed on EKG-monitor his rhythm and continue his current regimen and get further information from nursing home.  Incomplete database  Wally Gan MD   10/18/2020  20:27 EDT  Much of this encounter note  is an electronic transcription/translation of spoken language to printed text. The electronic translation of spoken language may permit erroneous, or at times, nonsensical words or phrases to be inadvertently transcribed; Although I have reviewed the note for such errors, some may still exist

## 2020-10-19 NOTE — DISCHARGE PLACEMENT REQUEST
"Blaze Arriola (70 y.o. Male)     Date of Birth Social Security Number Address Home Phone MRN    1950  [address] [place] [address] [phone] [id number]    [employer] Marital Status          None        Admission Date Admission Type Admitting Provider Attending Provider Department, Room/Bed    10/18/20 Emergency Wally Gan MD Jackson, Alan David, MD [address], [address]    Discharge Date Discharge Disposition Discharge Destination                       Attending Provider: Jeff Jimenez MD    Allergies: No Known Allergies    Isolation: Enh Drop/Con   Infection: COVID (confirmed) (10/18/20)   Code Status: CPR    Ht: 172.7 cm (68\")   Wt: 85.3 kg (188 lb)    Admission Cmt: None   Principal Problem: Pneumonia due to COVID-19 virus [U07.1,J12.89]                 Active Insurance as of 10/18/2020     Primary Coverage     Payor Plan Insurance Group Employer/Plan Group    MEDICARE MEDICARE A & B      Payor Plan Address Payor Plan Phone Number Payor Plan Fax Number Effective Dates    [address] [phone]  9/1/2016 - None Entered    [facility] [id number]       Subscriber Name Subscriber Birth Date Member ID       BLAZE ARRIOLA 1950 [id number]           Secondary Coverage     Payor Plan Insurance Group Employer/Plan Group    [place]      Payor Plan Address Payor Plan Phone Number Payor Plan Fax Number Effective Dates    [address] [phone]  10/18/2020 - None Entered    [place] [address]       Subscriber Name Subscriber Birth Date Member ID       BLAZE ARRIOLA 1950 [id number]                 Emergency Contacts      (Rel.) Home Phone Work Phone Mobile Phone    LARISSA RETANA (Daughter) [phone] -- --                "

## 2020-10-19 NOTE — PLAN OF CARE
Goal Outcome Evaluation:  Plan of Care Reviewed With: patient  Progress: no change  Outcome Summary: Admitted from ED this shift. Resting comfortably on RA. VSS. Aphasic. R side flaccid from previous TBI. Turn Q2. Accumax in place. Cardio consult placed for possible CHF. Med rec complete through daughter (POA) and facility paperwork in chart. CTM

## 2020-10-19 NOTE — CONSULTS
"Adult Nutrition  Assessment/PES    Patient Name:  Blaze Mayorga  YOB: 1950  MRN: [id number]  Admit Date:  10/18/2020    Assessment Date:  10/19/2020    Comments:  Nutrition consult for TF assessment.  Admitted with COVID 19.  NH resident.  Significant expressive aphasia.  PEG tube in place.  Patient also on oral diet.    Patient receives bolus TFs of Diabetisource - 250 mL BID that runs 150 ml/hr.  Receives 250 ml free water flushes TID.    No PO data available at this time.    RD to continue to follow.    RD working remotely due to covid-19 pandemic. Assessment completed based on review of electronic medical record. RD may be reached via secure chat or email.     Reason for Assessment     Row Name 10/19/20 1047          Reason for Assessment    Reason For Assessment  nurse/nurse practitioner consult;identified at risk by screening criteria     Diagnosis  neurologic conditions;gastrointestinal disease;cardiac disease;other (see comments);pulmonary disease;diabetes diagnosis/complications;psychosocial CVA, R sided hemiplegia, dysphagia, PEG, HTN, Afib, TBI, BPH, COPD, DM, depression, SAH; adm with COVID 19     Identified At Risk by Screening Criteria  tube feeding or parenteral nutrition         Nutrition/Diet History     Row Name 10/19/20 1048          Nutrition/Diet History    Typical Food/Fluid Intake  on TFs of Diabetisource 250 mL at 150 mL/hr BID (2200 and 0200), on diet as well         Anthropometrics     Row Name 10/19/20 1056          Anthropometrics    Height  172.7 cm (67.99\")        Admit Weight    Admit Weight  -- 188# 10/18        Ideal Body Weight (IBW)    Ideal Body Weight (IBW) (kg)  70.87        Body Mass Index (BMI)    BMI Assessment  BMI 25-29.9: overweight 28.60         Labs/Tests/Procedures/Meds     Row Name 10/19/20 1101          Labs/Procedures/Meds    Lab Results Reviewed  reviewed, pertinent     Lab Results Comments  Gluc, AlkP, CRP        Diagnostic Tests/Procedures    " "Diagnostic Test/Procedure Reviewed  reviewed, pertinent        Medications    Pertinent Medications Reviewed  reviewed, pertinent     Pertinent Medications Comments  lasix         Physical Findings     Row Name 10/19/20 1103          Physical Findings    Overall Physical Appearance  overweight     Gastrointestinal  feeding tube     Tubes  PEG     Skin  edema B=13, intact         Estimated/Assessed Needs     Row Name 10/19/20 1104 10/19/20 1056       Calculation Measurements    Weight Used For Calculations  69.9 kg (154 lb) IBW  --    Height  --  172.7 cm (67.99\")       Estimated/Assessed Needs       KCAL/KG    KCAL/KG  30 Kcal/Kg (kcal);35 Kcal/Kg (kcal)  --    30 Kcal/Kg (kcal)  2095.62  --    35 Kcal/Kg (kcal)  2444.89  --       Protein Requirements    Weight Used For Protein Calculations  69.9 kg (154 lb) IBW  --    Est Protein Requirement Amount (gms/kg)  1.5 gm protein  --    Estimated Protein Requirements (gms/day)  104.78  --       Fluid Requirements    Fluid Requirements (mL/day)  2096  --        Nutrition Prescription Ordered     Row Name 10/19/20 1107          Nutrition Prescription PO    Current PO Diet  Regular     Common Modifiers  Cardiac        Nutrition Prescription EN    Enteral Route  PEG     Product  Diabetisource AC (Glucerna 1.2)     TF Delivery Method  Bolus     TF Bolus Goal Volume (mL)  250 mL     TF Bolus Frequency  2 times a day     TF Bolus Cycle Over  Other (comment) 150 mL/hr     Water flush (mL)   250 mL     Water Flush Frequency  Other (comment) TID         Evaluation of Received Nutrient/Fluid Intake     Row Name 10/19/20 1110          PO Evaluation    Number of Days PO Intake Evaluated  Insufficient Data         Problem/Interventions:  Problem 1     Row Name 10/19/20 1112          Nutrition Diagnoses Problem 1    Problem 1  Needs Alternate Route     Etiology (related to)  Medical Diagnosis;MNT for Treatment/Condition     Signs/Symptoms (evidenced by)  EN Intake Delivery     "     Intervention Goal     Row Name 10/19/20 1112          Intervention Goal    General  Maintain nutrition;Reduce/improve symptoms;Disease management/therapy;Nutrition support treatment;Meet nutritional needs for age/condition     PO  Establish PO;Tolerate PO;PO intake (%)     PO Intake %  75 %     TF/PN  Maintain TF/PN     Weight  No significant weight loss         Nutrition Intervention     Row Name 10/19/20 1113          Nutrition Intervention    RD/Tech Action  Follow Tx progress;Care plan reviewd         Nutrition Prescription     Row Name 10/19/20 1113          Nutrition Prescription EN    Enteral Prescription  Enteral to supply     Enteral Route  PEG     Product  Diabetisource     TF Delivery Method  Bolus     TF Bolus Goal Volume (mL)  250 mL     TF Bolus Frequency  2 times a day     TF Bolus Cycle Over  Other (comment) 150 mL/hr     Water flush (mL)   250 mL     Water Flush Frequency  Other (comment) TID        EN to Supply    Kcal/Day  600 Kcal/Day     Protein (gm/day)  30 gm/day     TF Free H2O (mL)  408 mL     Total Free H2O (mL/day)  1158 mL/day TFs + flushes         Education/Evaluation     Row Name 10/19/20 1115          Education    Education  Will Instruct as appropriate        Monitor/Evaluation    Monitor  Per protocol;I&O;PO intake;Pertinent labs;TF delivery/tolerance;Weight;Symptoms           Electronically signed by:  Kate Ballard RD  10/19/20 11:25 EDT

## 2020-10-19 NOTE — CONSULTS
[facility]  Cardiology Consult Note    Patient Identification:  Name: Blaze Mayorga  Age: 70 y.o.  Sex: male  :  1950  MRN: [id number]             Requesting Physician:  Wally Gan MD    Reason for Consultation / Chief Complaint: CHF      History of Present Illness:   Blaze Mayorga is a 70-year-old male, who is new to our service.  He has a history of atrial fibrillation, PEG tube placement, approximately BPH, COPD, diabetes mellitus, dysphagia, right-sided hemiplegia, hypertension, major depressive disorder, and history of stroke.  He is a nursing home resident and it was noted with him having low oxygen saturations earlier yesterday.  He is a poor historian.  No ischemic work-up noted.    On admission he tested positive for COVID-19. Chest x-ray revealed possible pneumonia with right lung base with generalized fluid overload and vascular congestion.  Labs revealed lactic 1.5, hemoglobin 13.3, platelets 256, troponin 0.015, BNP 4K, , potassium 4.3, creatinine 0.98, and ALT/AST WNL.    Comorbid cardiac risk factors: hypertension diabetes mellitus, age    Past Medical History:  Past Medical History:   Diagnosis Date   • A-fib (CMS/[facility])    • Aphasia    • Apraxia    • BPH (benign prostatic hyperplasia)    • COPD (chronic obstructive pulmonary disease) (CMS/[facility])    • Diabetes mellitus (CMS/[facility])    • Dysphagia    • Hemiplegia (CMS/[facility])     Right side   • Hypertension    • Major depressive disorder    • Neuromuscular dysfunction of bladder, unspecified    • Stroke (CMS/[facility]) 2020   • Subarachnoid hemorrhage (CMS/[facility])      Past Surgical History:  Past Surgical History:   Procedure Laterality Date   • PEG TUBE INSERTION        Allergies:  No Known Allergies  Home Meds:  Medications Prior to Admission   Medication Sig Dispense Refill Last Dose   • acetaminophen (TYLENOL) 325 MG tablet 650 mg by Per G Tube route Every 6 (Six) Hours As Needed for Mild Pain .      • amantadine (SYMMETREL)  100 MG capsule 150 mg by Per G Tube route Daily With Lunch.      • amantadine (SYMMETREL) 100 MG capsule 200 mg by Per G Tube route Every Morning.      • apixaban (ELIQUIS) 5 MG tablet tablet 5 mg by Per G Tube route 2 (Two) Times a Day.      • atorvastatin (LIPITOR) 40 MG tablet 40 mg by Per G Tube route Every Night.      • carvedilol (COREG) 6.25 MG tablet 6.25 mg by Per G Tube route 2 (Two) Times a Day With Meals.      • cloNIDine (CATAPRES) 0.1 MG tablet 0.1 mg by Per G Tube route Every 6 (Six) Hours As Needed for High Blood Pressure (SBP>170 or DBP>105).      • clopidogrel (PLAVIX) 75 MG tablet 75 mg by Per G Tube route Daily.      • Ergocalciferol (Vitamin D2) 50 MCG (2000 UT) tablet Take 50,000 Units by mouth 1 (One) Time Per Week. Q Sunday      • FLUoxetine (PROzac) 20 MG capsule 20 mg by Per G Tube route Daily.      • furosemide (LASIX) 20 MG tablet 10 mg by Per G Tube route Daily.      • hydrALAZINE (APRESOLINE) 50 MG tablet 50 mg by Per G Tube route 3 (Three) Times a Day.      • lisinopril (PRINIVIL,ZESTRIL) 20 MG tablet 20 mg by Per G Tube route Daily.      • magnesium hydroxide (MILK OF MAGNESIA) 400 MG/5ML suspension Take 30 mL by mouth Daily As Needed for Constipation.      • metFORMIN (GLUCOPHAGE) 500 MG tablet 500 mg by Per G Tube route 2 (Two) Times a Day With Meals.      • methylphenidate (RITALIN) 5 MG tablet Take 5 mg by mouth 2 (Two) Times a Day.      • pantoprazole (PROTONIX) 40 MG EC tablet Take 40 mg by mouth Daily.      • sennosides-docusate (senna-docusate sodium) 8.6-50 MG per tablet 1 tablet by Per G Tube route Daily.      • tamsulosin (FLOMAX) 0.4 MG capsule 24 hr capsule Take 1 capsule by mouth Daily.      • tiZANidine (ZANAFLEX) 4 MG tablet 2 mg by Per G Tube route 3 (Three) Times a Day.        Current Meds:    Scheduled    Medication Ordered Dose/Rate, Route, Frequency Last Action   atorvastatin (LIPITOR) tablet 40 mg 40 mg, PO, Nightly Ordered   carvedilol (COREG) tablet 6.25 mg  6.25 mg, PO, BID With Meals Ordered   furosemide (LASIX) injection 40 mg 40 mg, IV, Daily Given, 40 mg at 10/19 0855   nitroglycerin (NITROSTAT) ointment 1 inch 1 inch, TOP, Q6H Given, 1 inch at 10/19 0559   sodium chloride 0.9 % flush 10 mL 10 mL, IV, Q12H Given, 10 mL at 10/19 0856   PRN    Medication Ordered Dose/Rate, Route, Frequency Last Action   acetaminophen (TYLENOL) 160 MG/5ML solution 650 mg (Or Linked Group #1) 650 mg, PO, Q4H PRN Ordered   acetaminophen (TYLENOL) suppository 650 mg (Or Linked Group #1) 650 mg, RE, Q4H PRN Ordered   acetaminophen (TYLENOL) tablet 650 mg (Or Linked Group #1) 650 mg, PO, Q4H PRN Ordered   nitroglycerin (NITROSTAT) SL tablet 0.4 mg 0.4 mg, SL, Q5 Min PRN Ordered   ondansetron (ZOFRAN) injection 4 mg 4 mg, IV, Q6H PRN Ordered   Pharmacy to Dose enoxaparin (LOVENOX) No Dose/Rate, XX, Continuous PRN Ordered   sodium chloride 0.9 % flush 10 mL 10 mL, IV, PRN Ordered   sodium chloride 0.9 % flush 10 mL (And Linked Group #2) 10 mL, IV, PRN Ordered       Social History:   Social History     Tobacco Use   • Smoking status: Never Smoker   • Tobacco comment: aphasic   Substance Use Topics   • Alcohol use: Not on file      Family History:  History reviewed. No pertinent family history.     Review of Systems COVID                   Constitutional:  Temp:  [97.9 °F (36.6 °C)-98.9 °F (37.2 °C)] 97.9 °F (36.6 °C)  Heart Rate:  [60-75] 70  Resp:  [18-22] 20  BP: (132-162)/() 138/85    Physical Exam COVID     CDC requirements                Cardiographics  ECG:     Telemetry:  Atrial fibrillation           Imaging  Chest X-ray:   IMPRESSION:  FINDINGS consistent with congestive heart failure. There  could be an element of pneumonia in the right lung base. Please  Correlate. 2. Follow-up films recommended.     This report was finalized on 10/18/2020 6:30 PM by Dr. Moshe Terry M.D.     Lab Review   Results from last 7 days   Lab Units 10/18/20  3804   TROPONIN T ng/mL 0.015          Results from last 7 days   Lab Units 10/18/20  1745   SODIUM mmol/L 138   POTASSIUM mmol/L 4.3   BUN mg/dL 20   CREATININE mg/dL 0.98   CALCIUM mg/dL 9.8        Results from last 7 days   Lab Units 10/19/20  0910 10/18/20  1745   WBC 10*3/mm3 5.10 7.30   HEMOGLOBIN g/dL 13.3 14.3   HEMATOCRIT % 40.5 42.6   PLATELETS 10*3/mm3 256 265         The following medical decision was discussed in detail with Dr. Yip    Assessment:  Acute congestive diastolic heart failure  Acute respiratory failure with hypoxia  Atrial fibrillation at home anticoagulated with Eliquis  Hypertension  Diabetes mellitus  Dysphagia right-sided hemiplegia  History of stroke: At home on Plavix  COPD      Recommendations / Plan:       70-year-old male with known history of cardiomyopathy congestive heart failure admitted with worsening shortness of breath has Covid  Patient also has atrial fibrillation with controlled rate on anticoagulation  Continue diuretics    Echocardiogram is pending  10/19/2020, 09:40 EDT  MD THIEN Horton/Transcription:   Dictated utilizing Dragon dictation

## 2020-10-19 NOTE — ED NOTES
Nursing report ED to floor  Blaze Mayorga  70 y.o.  male    HPI (triage note):   Chief Complaint   Patient presents with   • Shortness of Breath       Admitting doctor:   Wally Gan MD    Admitting diagnosis:   The primary encounter diagnosis was Acute congestive heart failure, unspecified heart failure type (CMS/HCC). A diagnosis of Acute respiratory failure with hypoxia (CMS/HCC) was also pertinent to this visit.    Code status:   Current Code Status     Date Active Code Status Order ID Comments User Context       Not on file    Advance Care Planning Activity          Allergies:   Patient has no known allergies.    Weight:       10/18/20  1730   Weight: 87 kg (191 lb 11.2 oz)       Most recent vitals:   Vitals:    10/18/20 1745 10/18/20 1758 10/18/20 1856 10/18/20 1945   BP: (!) 162/111   (!) 159/115   Pulse: 70 62 71 60   Resp:       Temp:       TempSrc:       SpO2: 100% 100% 100% 99%   Weight:       Height:           Active LDAs/IV Access:   Lines, Drains & Airways    Active LDAs     Name:   Placement date:   Placement time:   Site:   Days:    Peripheral IV 10/18/20 1740 Left Antecubital   10/18/20    1740    Antecubital   less than 1                Labs (abnormal labs have a star):   Labs Reviewed   RESPIRATORY PANEL PCR W/ COVID-19 (SARS-COV-2) [facility] IN-HOUSE, NP SWAB IN UTM/VTP, 3-4 HR TAT - Abnormal; Notable for the following components:       Result Value    COVID19 Detected (*)     All other components within normal limits    Narrative:     Fact sheet for providers: https://docs.[employer]/wp-content/uploads/PRA7251-6297-WY3.1-EUA-Provider-Fact-Sheet-3.pdf    Fact sheet for patients: https://docs.[employer]/wp-content/uploads/PXW5085-0806-JV3.1-EUA-Patient-Fact-Sheet-1.pdf   COMPREHENSIVE METABOLIC PANEL - Abnormal; Notable for the following components:    Glucose 114 (*)     Alkaline Phosphatase 153 (*)     All other components within normal limits    Narrative:     GFR Normal  ">60  Chronic Kidney Disease <60  Kidney Failure <15     BNP (IN-HOUSE) - Abnormal; Notable for the following components:    proBNP 4,428.0 (*)     All other components within normal limits    Narrative:     Among patients with dyspnea, NT-proBNP is highly sensitive for the detection of acute congestive heart failure. In addition NT-proBNP of <300 pg/ml effectively rules out acute congestive heart failure with 99% negative predictive value.    Results may be falsely decreased if patient taking Biotin.     CBC WITH AUTO DIFFERENTIAL - Abnormal; Notable for the following components:    Lymphocyte % 13.2 (*)     All other components within normal limits   TROPONIN (IN-HOUSE) - Normal    Narrative:     Troponin T Reference Range:  <= 0.03 ng/mL-   Negative for AMI  >0.03 ng/mL-     Abnormal for myocardial necrosis.  Clinicians would have to utilize clinical acumen, EKG, Troponin and serial changes to determine if it is an Acute Myocardial Infarction or myocardial injury due to an underlying chronic condition.       Results may be falsely decreased if patient taking Biotin.     LACTIC ACID, PLASMA - Normal   PROCALCITONIN - Normal    Narrative:     As a Marker for Sepsis (Non-Neonates):   1. <0.5 ng/mL represents a low risk of severe sepsis and/or septic shock.  1. >2 ng/mL represents a high risk of severe sepsis and/or septic shock.    As a Marker for Lower Respiratory Tract Infections that require antibiotic therapy:  PCT on Admission     Antibiotic Therapy             6-12 Hrs later  > 0.5                Strongly Recommended            >0.25 - <0.5         Recommended  0.1 - 0.25           Discouraged                   Remeasure/reassess PCT  <0.1                 Strongly Discouraged          Remeasure/reassess PCT      As 28 day mortality risk marker: \"Change in Procalcitonin Result\" (> 80 % or <=80 %) if Day 0 (or Day 1) and Day 4 values are available. Refer to http://www.[employer]-pct-calculator.com/   Change in PCT " <=80 %   A decrease of PCT levels below or equal to 80 % defines a positive change in PCT test result representing a higher risk for 28-day all-cause mortality of patients diagnosed with severe sepsis or septic shock.  Change in PCT > 80 %   A decrease of PCT levels of more than 80 % defines a negative change in PCT result representing a lower risk for 28-day all-cause mortality of patients diagnosed with severe sepsis or septic shock.                Results may be falsely decreased if patient taking Biotin.    COVID PRE-OP / PRE-PROCEDURE SCREENING ORDER (NO ISOLATION)    Narrative:     The following orders were created for panel order COVID PRE-OP / PRE-PROCEDURE SCREENING ORDER (NO ISOLATION) - Swab, Nasopharynx.  Procedure                               Abnormality         Status                     ---------                               -----------         ------                     Respiratory Panel PCR w/...[[id number]]  Abnormal            Final result                 Please view results for these tests on the individual orders.   CBC AND DIFFERENTIAL    Narrative:     The following orders were created for panel order CBC & Differential.  Procedure                               Abnormality         Status                     ---------                               -----------         ------                     CBC Auto Differential[[id number]]        Abnormal            Final result                 Please view results for these tests on the individual orders.       EKG:   ECG 12 Lead   Preliminary Result   HEART RATE= 63  bpm   RR Interval= 956  ms   AL Interval=   ms   P Horizontal Axis=   deg   P Front Axis=   deg   QRSD Interval= 145  ms   QT Interval= 439  ms   QRS Axis= -54  deg   T Wave Axis= 103  deg   - ABNORMAL ECG -   Atrial fibrillation   Ventricular premature complex   Left bundle branch block   Electronically Signed By:    Date and Time of Study: 2020-10-18 18:12:09          Meds given in ED:    Medications   sodium chloride 0.9 % flush 10 mL (has no administration in time range)   furosemide (LASIX) injection 40 mg (40 mg Intravenous Given 10/18/20 1845)   nitroglycerin (NITROSTAT) ointment 1 inch (1 inch Topical Given 10/18/20 1845)       Imaging results:  Xr Chest 1 View    Result Date: 10/18/2020  FINDINGS consistent with congestive heart failure. There could be an element of pneumonia in the right lung base. Please correlate. 2. Follow-up films recommended.  This report was finalized on 10/18/2020 6:30 PM by Dr. Moshe Terry M.D.        Ambulatory status:   - bedrest    Social issues:   Social History     Socioeconomic History   • Marital status:      Spouse name: Not on file   • Number of children: Not on file   • Years of education: Not on file   • Highest education level: Not on file         Juan Alberto Fulton RN  10/18/20 2000

## 2020-10-19 NOTE — PLAN OF CARE
Goal Outcome Evaluation:  Plan of Care Reviewed With: patient, daughter  Progress: no change  Outcome Summary: Patient alert, but aphasic from previous stroke. Turn every 2 hrs. Peg tube in place. Meds given through peg. Bp elevated, Dr. Jimenez aware. Febrile this shift. On room air. No s/s of acute distress. Will continue to monitor.

## 2020-10-19 NOTE — PROGRESS NOTES
Name: Blaze Mayorga ADMIT: 10/18/2020   : 1950  PCP: Anjel Nguyen MD    MRN: [id number] LOS: 1 days   AGE/SEX: 70 y.o. male  ROOM: [facility]   Subjective   Chief Complaint   Patient presents with   • Shortness of Breath     86 % RA at SNF  Now on supplemental oxygen    History of stroke    Got lasix last night    ROS  unreliable due to his h/o cva    Objective   Vital Signs  Temp:  [97.9 °F (36.6 °C)-98.9 °F (37.2 °C)] 97.9 °F (36.6 °C)  Heart Rate:  [60-79] 79  Resp:  [18-22] 20  BP: (132-162)/() 150/98  SpO2:  [91 %-100 %] 97 %  on  Flow (L/min):  [1-2] 1;   Device (Oxygen Therapy): room air  Body mass index is 28.68 kg/m².    Elderly, chronically ill  Some aphasia and difficulty communicating  Physical Exam  HENT:      Head: Normocephalic and atraumatic.   Eyes:      General: No scleral icterus.  Neck:      Musculoskeletal: Neck supple.   Cardiovascular:      Rate and Rhythm: Normal rate and regular rhythm.      Heart sounds: Normal heart sounds.   Pulmonary:      Effort: Respiratory distress (slight) present.      Breath sounds: Normal breath sounds.   Abdominal:      General: There is no distension.      Palpations: Abdomen is soft.      Tenderness: There is no abdominal tenderness.   Neurological:      Mental Status: He is alert.         Results Review:       I reviewed the patient's new clinical results.  Results from last 7 days   Lab Units 10/19/20  0910 10/18/20  1745   WBC 10*3/mm3 5.10 7.30   HEMOGLOBIN g/dL 13.3 14.3   PLATELETS 10*3/mm3 256 265     Results from last 7 days   Lab Units 10/19/20  0910 10/18/20  1745   SODIUM mmol/L 140 138   POTASSIUM mmol/L 3.6 4.3   CHLORIDE mmol/L 104 103   CO2 mmol/L 23.2 26.4   BUN mg/dL 16 20   CREATININE mg/dL 1.01 0.98   GLUCOSE mg/dL 104* 114*   Estimated Creatinine Clearance: 73.4 mL/min (by C-G formula based on SCr of 1.01 mg/dL).  Results from last 7 days   Lab Units 10/19/20  0910 10/18/20  1745   ALBUMIN g/dL 4.00 4.30   BILIRUBIN mg/dL  1.3* 1.1   ALK PHOS U/L 134* 153*   AST (SGOT) U/L 15 16   ALT (SGPT) U/L 24 32     Results from last 7 days   Lab Units 10/19/20  0910 10/18/20  1745   CALCIUM mg/dL 9.4 9.8   ALBUMIN g/dL 4.00 4.30     Results from last 7 days   Lab Units 10/19/20  0910 10/18/20  1745   PROCALCITONIN ng/mL 0.08 0.06   LACTATE mmol/L 1.5 1.7       Coag     HbA1C No results found for: HGBA1C  Infection   Results from last 7 days   Lab Units 10/19/20  0910 10/18/20  1745   PROCALCITONIN ng/mL 0.08 0.06     Radiology(recent) Xr Chest 1 View    Result Date: 10/18/2020  FINDINGS consistent with congestive heart failure. There could be an element of pneumonia in the right lung base. Please correlate. 2. Follow-up films recommended.  This report was finalized on 10/18/2020 6:30 PM by Dr. Moshe Terry M.D.      Troponin T   Date Value Ref Range Status   10/19/2020 0.017 0.000 - 0.030 ng/mL Final     No components found for: TSH;2    amantadine, 100 mg, Per G Tube, Daily With Lunch  [START ON 10/20/2020] amantadine, 200 mg, Per G Tube, QAM  apixaban, 5 mg, Per G Tube, Q12H  atorvastatin, 40 mg, Per G Tube, Nightly  carvedilol, 6.25 mg, Oral, BID With Meals  clopidogrel, 75 mg, Per G Tube, Daily  FLUoxetine, 20 mg, Per G Tube, Daily  furosemide, 40 mg, Intravenous, Daily  hydrALAZINE, 50 mg, Per G Tube, TID  lisinopril, 20 mg, Per G Tube, Daily  methylphenidate, 5 mg, Oral, BID With Meals  nitroglycerin, 1 inch, Topical, Q6H  pantoprazole, 40 mg, Oral, Daily  sennosides-docusate, 1 tablet, Per G Tube, Daily  sodium chloride, 10 mL, Intravenous, Q12H  tamsulosin, 0.4 mg, Oral, Daily  tiZANidine, 2 mg, Per G Tube, TID      Pharmacy to Dose enoxaparin (LOVENOX),     No diet orders on file      Assessment/Plan      Active Hospital Problems    Diagnosis  POA   • **Pneumonia due to COVID-19 virus [U07.1, J12.89]  Yes   • Type 2 diabetes mellitus with hyperglycemia (CMS/HCC) [E11.65]  Yes   • Hypertension [I10]  Yes   • Dysphagia [R13.10]  Yes    • A-fib (CMS/HCC) [I48.91]  Unknown   • Acute on chronic diastolic congestive heart failure (CMS/HCC) [I50.33]  Yes   • Hypoxia [R09.02]  Yes      Resolved Hospital Problems   No resolved problems to display.       · Supportive care for covid including oxygen if needed. With hypoxia will start dexamethasone 6mg  · Given lasix and nitropaste. On a/c as well. Cardiology following  · H/o stroke with hemiplegia  · Restart home BP meds  · On TFs  · Add SSI      DW RN  Reviewed previous records    Jeff Jimenez MD  [facility]  10/19/20  10:50 EDT

## 2020-10-19 NOTE — ED NOTES
I wore full protective equipment throughout this patient encounter, including a face mask, eye shield, gloves and gown.  Hand hygiene/washing of hands was performed before donning protective equipment and after removal when leaving room     Juan Alberto Fulton RN  10/18/20 2009

## 2020-10-20 PROBLEM — I42.9 CARDIOMYOPATHY (HCC): Status: ACTIVE | Noted: 2020-10-20

## 2020-10-20 PROBLEM — I50.43 ACUTE ON CHRONIC COMBINED SYSTOLIC AND DIASTOLIC CONGESTIVE HEART FAILURE (HCC): Status: ACTIVE | Noted: 2020-10-18

## 2020-10-20 LAB
ANION GAP SERPL CALCULATED.3IONS-SCNC: 12.6 MMOL/L (ref 5–15)
BUN SERPL-MCNC: 20 MG/DL (ref 8–23)
BUN/CREAT SERPL: 19.8 (ref 7–25)
CALCIUM SPEC-SCNC: 9.1 MG/DL (ref 8.6–10.5)
CHLORIDE SERPL-SCNC: 101 MMOL/L (ref 98–107)
CK SERPL-CCNC: 43 U/L (ref 20–200)
CO2 SERPL-SCNC: 23.4 MMOL/L (ref 22–29)
CREAT SERPL-MCNC: 1.01 MG/DL (ref 0.76–1.27)
CRP SERPL-MCNC: 5.96 MG/DL (ref 0–0.5)
FERRITIN SERPL-MCNC: 411 NG/ML (ref 30–400)
GFR SERPL CREATININE-BSD FRML MDRD: 89 ML/MIN/1.73
GLUCOSE BLDC GLUCOMTR-MCNC: 126 MG/DL (ref 70–130)
GLUCOSE BLDC GLUCOMTR-MCNC: 132 MG/DL (ref 70–130)
GLUCOSE BLDC GLUCOMTR-MCNC: 153 MG/DL (ref 70–130)
GLUCOSE BLDC GLUCOMTR-MCNC: 159 MG/DL (ref 70–130)
GLUCOSE SERPL-MCNC: 145 MG/DL (ref 65–99)
MAGNESIUM SERPL-MCNC: 2.2 MG/DL (ref 1.6–2.4)
POTASSIUM SERPL-SCNC: 3.6 MMOL/L (ref 3.5–5.2)
SODIUM SERPL-SCNC: 137 MMOL/L (ref 136–145)
TROPONIN T SERPL-MCNC: 0.02 NG/ML (ref 0–0.03)

## 2020-10-20 PROCEDURE — 92610 EVALUATE SWALLOWING FUNCTION: CPT

## 2020-10-20 PROCEDURE — 82962 GLUCOSE BLOOD TEST: CPT

## 2020-10-20 PROCEDURE — 82550 ASSAY OF CK (CPK): CPT | Performed by: INTERNAL MEDICINE

## 2020-10-20 PROCEDURE — 86140 C-REACTIVE PROTEIN: CPT | Performed by: INTERNAL MEDICINE

## 2020-10-20 PROCEDURE — 80048 BASIC METABOLIC PNL TOTAL CA: CPT | Performed by: NURSE PRACTITIONER

## 2020-10-20 PROCEDURE — 83735 ASSAY OF MAGNESIUM: CPT | Performed by: NURSE PRACTITIONER

## 2020-10-20 PROCEDURE — 25010000002 FUROSEMIDE PER 20 MG: Performed by: INTERNAL MEDICINE

## 2020-10-20 PROCEDURE — 84484 ASSAY OF TROPONIN QUANT: CPT | Performed by: NURSE PRACTITIONER

## 2020-10-20 PROCEDURE — 63710000001 INSULIN LISPRO (HUMAN) PER 5 UNITS: Performed by: INTERNAL MEDICINE

## 2020-10-20 PROCEDURE — 63710000001 DEXAMETHASONE PER 0.25 MG: Performed by: INTERNAL MEDICINE

## 2020-10-20 PROCEDURE — 99232 SBSQ HOSP IP/OBS MODERATE 35: CPT | Performed by: INTERNAL MEDICINE

## 2020-10-20 PROCEDURE — 82728 ASSAY OF FERRITIN: CPT | Performed by: INTERNAL MEDICINE

## 2020-10-20 RX ADMIN — APIXABAN 5 MG: 5 TABLET, FILM COATED ORAL at 22:06

## 2020-10-20 RX ADMIN — HYDRALAZINE HYDROCHLORIDE 50 MG: 50 TABLET, FILM COATED ORAL at 09:46

## 2020-10-20 RX ADMIN — TIZANIDINE 2 MG: 4 TABLET ORAL at 09:47

## 2020-10-20 RX ADMIN — TIZANIDINE 2 MG: 4 TABLET ORAL at 16:58

## 2020-10-20 RX ADMIN — APIXABAN 5 MG: 5 TABLET, FILM COATED ORAL at 09:46

## 2020-10-20 RX ADMIN — CARVEDILOL 6.25 MG: 6.25 TABLET, FILM COATED ORAL at 09:46

## 2020-10-20 RX ADMIN — NITROGLYCERIN 1 INCH: 20 OINTMENT TOPICAL at 06:22

## 2020-10-20 RX ADMIN — TAMSULOSIN HYDROCHLORIDE 0.4 MG: 0.4 CAPSULE ORAL at 22:08

## 2020-10-20 RX ADMIN — AMANTADINE HYDROCHLORIDE 100 MG: 100 CAPSULE ORAL at 12:42

## 2020-10-20 RX ADMIN — NITROGLYCERIN 1 INCH: 20 OINTMENT TOPICAL at 17:02

## 2020-10-20 RX ADMIN — INSULIN LISPRO 3 UNITS: 100 INJECTION, SOLUTION INTRAVENOUS; SUBCUTANEOUS at 12:42

## 2020-10-20 RX ADMIN — DOCUSATE SODIUM 50MG AND SENNOSIDES 8.6MG 1 TABLET: 8.6; 5 TABLET, FILM COATED ORAL at 09:47

## 2020-10-20 RX ADMIN — TIZANIDINE 2 MG: 4 TABLET ORAL at 22:05

## 2020-10-20 RX ADMIN — ATORVASTATIN CALCIUM 40 MG: 20 TABLET, FILM COATED ORAL at 22:04

## 2020-10-20 RX ADMIN — HYDRALAZINE HYDROCHLORIDE 50 MG: 50 TABLET, FILM COATED ORAL at 16:59

## 2020-10-20 RX ADMIN — METHYLPHENIDATE HYDROCHLORIDE 5 MG: 5 TABLET ORAL at 09:46

## 2020-10-20 RX ADMIN — DEXAMETHASONE 6 MG: 4 TABLET ORAL at 09:46

## 2020-10-20 RX ADMIN — FLUOXETINE 20 MG: 20 SOLUTION ORAL at 09:47

## 2020-10-20 RX ADMIN — FUROSEMIDE 40 MG: 10 INJECTION, SOLUTION INTRAMUSCULAR; INTRAVENOUS at 09:47

## 2020-10-20 RX ADMIN — PANTOPRAZOLE SODIUM 40 MG: 40 TABLET, DELAYED RELEASE ORAL at 09:48

## 2020-10-20 RX ADMIN — HYDRALAZINE HYDROCHLORIDE 50 MG: 50 TABLET, FILM COATED ORAL at 22:07

## 2020-10-20 RX ADMIN — LISINOPRIL 20 MG: 20 TABLET ORAL at 09:47

## 2020-10-20 RX ADMIN — INSULIN LISPRO 3 UNITS: 100 INJECTION, SOLUTION INTRAVENOUS; SUBCUTANEOUS at 17:02

## 2020-10-20 RX ADMIN — SODIUM CHLORIDE, PRESERVATIVE FREE 10 ML: 5 INJECTION INTRAVENOUS at 09:48

## 2020-10-20 RX ADMIN — CARVEDILOL 6.25 MG: 6.25 TABLET, FILM COATED ORAL at 17:01

## 2020-10-20 RX ADMIN — CLOPIDOGREL 75 MG: 75 TABLET, FILM COATED ORAL at 09:46

## 2020-10-20 RX ADMIN — SODIUM CHLORIDE, PRESERVATIVE FREE 10 ML: 5 INJECTION INTRAVENOUS at 22:08

## 2020-10-20 RX ADMIN — NITROGLYCERIN 1 INCH: 20 OINTMENT TOPICAL at 12:42

## 2020-10-20 RX ADMIN — AMANTADINE HYDROCHLORIDE 200 MG: 100 CAPSULE ORAL at 06:22

## 2020-10-20 RX ADMIN — METHYLPHENIDATE HYDROCHLORIDE 5 MG: 5 TABLET ORAL at 17:02

## 2020-10-20 NOTE — PLAN OF CARE
Problem: Adult Inpatient Plan of Care  Goal: Plan of Care Review  Outcome: Ongoing, Progressing  Flowsheets (Taken 10/20/2020 1244)  Progress: no change  Plan of Care Reviewed With: patient  Outcome Summary:  Bedside swallow evaluation performed. No overt s/s aspiration across any assessed consistency. Slow mastication of soft solids. Overall impression mild dysphagia. Concerns exist secondary to CXR w/questionable pna at R lung base, which may be compounded by pt current dx COVID, with thins v. NTL similar in initiation timing and both with absence of s/s aspiration.      Recommend: mechanical soft solids, thin liquids; medications whole in puree or with thin liquids as tolerated; compensations to include: upright for all PO, small bites/sips, assist feeding.     SLP to continue to follow as indicated. As instrumental may be difficult, will monitor closely for respiratory status, tolerance, with considerations of utilization of PEG tube or further modifications to diet secondary to acuity of illness.

## 2020-10-20 NOTE — PROGRESS NOTES
Continued Stay Note  [facility]     Patient Name: Blaze Mayorga  MRN: [id number]  Today's Date: 10/20/2020    Admit Date: 10/18/2020    Discharge Plan     Row Name 10/20/20 1407       Plan    Plan  [facility]  (ambulance scheduled to transfer on 10/21 @ 1900)    Plan Comments  Spoke w/ Iraida, pt may return to [facility]; bed available tomorrow if needed. [facility] scheduled to transport on Wed 10/21 @ 1900. Phoned daughter/ Chyna to update; left message [phone].        Discharge Codes    No documentation.             Keyal Gayle RN

## 2020-10-20 NOTE — PLAN OF CARE
Goal Outcome Evaluation:  Plan of Care Reviewed With: patient  Progress: no change   VSS, on RA, no c/o pain, INC of bowl, and bladder, turn, meds crushed and given through peg. Intermittent tube feeding. Will continue to monitor

## 2020-10-20 NOTE — PROGRESS NOTES
[facility]  Cardiology Progress Note    Patient Identification:  Name: Blaze Mayorga  Age: 70 y.o.  Sex: male  :  1950  MRN: [id number]                 Follow Up / Chief Complaint: CHF    Interval History:       Subjective:    CDC Covid recommendation  Objective:    Past Medical History:  Past Medical History:   Diagnosis Date   • A-fib (CMS/[facility])    • Aphasia    • Apraxia    • BPH (benign prostatic hyperplasia)    • COPD (chronic obstructive pulmonary disease) (CMS/[facility])    • Diabetes mellitus (CMS/HCC)    • Dysphagia    • Hemiplegia (CMS/HCC)     Right side   • Hypertension    • Major depressive disorder    • Neuromuscular dysfunction of bladder, unspecified    • Stroke (CMS/HCC) 2020   • Subarachnoid hemorrhage (CMS/[facility])      Past Surgical History:  Past Surgical History:   Procedure Laterality Date   • PEG TUBE INSERTION          Social History:   Social History     Tobacco Use   • Smoking status: Never Smoker   • Tobacco comment: aphasic   Substance Use Topics   • Alcohol use: Not on file      Family History:  History reviewed. No pertinent family history.       Allergies:  No Known Allergies  Scheduled Meds:  amantadine, 100 mg, Daily With Lunch  amantadine, 200 mg, QAM  apixaban, 5 mg, Q12H  atorvastatin, 40 mg, Nightly  carvedilol, 6.25 mg, BID With Meals  clopidogrel, 75 mg, Daily  dexamethasone, 6 mg, Daily With Breakfast  FLUoxetine, 20 mg, Daily  furosemide, 40 mg, Daily  hydrALAZINE, 50 mg, TID  insulin lispro, 0-14 Units, TID AC  lisinopril, 20 mg, Daily  methylphenidate, 5 mg, BID With Meals  nitroglycerin, 1 inch, Q6H  pantoprazole, 40 mg, Daily  sennosides-docusate, 1 tablet, Daily  sodium chloride, 10 mL, Q12H  tamsulosin, 0.4 mg, Nightly  tiZANidine, 2 mg, TID            INTAKE AND OUTPUT:    Intake/Output Summary (Last 24 hours) at 10/20/2020 3011  Last data filed at 10/19/2020 2013  Gross per 24 hour   Intake 60 ml   Output --   Net 60 ml       Review of Systems:  Covid  GI:  Cardiac:  Pulmonary:    Constitutional:  Temp:  [98.1 °F (36.7 °C)-99.8 °F (37.7 °C)] 99.8 °F (37.7 °C)  Heart Rate:  [54-66] 63  Resp:  [18] 18  BP: (112-134)/(74-92) 112/83    Physical Exam: CDC recommendations for Covid                    Cardiographics  Telemetry:        Chest X-ray:   IMPRESSION:  FINDINGS consistent with congestive heart failure. There  could be an element of pneumonia in the right lung base. Please  Correlate. 2. Follow-up films recommended.     This report was finalized on 10/18/2020 6:30 PM by Dr. Moshe Terry M.D.     ECG:        Echocardiogram:   Imaging  Chest X-ray:   IMPRESSION:  FINDINGS consistent with congestive heart failure. There  could be an element of pneumonia in the right lung base. Please  Correlate. 2. Follow-up films recommended.     This report was finalized on 10/18/2020 6:30 PM by Dr. Moshe Terry M.D.     Lab Review   Results from last 7 days   Lab Units 10/20/20  0838 10/19/20  0910 10/18/20  1745   CK TOTAL U/L 43 37  --    TROPONIN T ng/mL 0.023 0.017 0.015     Results from last 7 days   Lab Units 10/20/20  0838   MAGNESIUM mg/dL 2.2     Results from last 7 days   Lab Units 10/20/20  0838   SODIUM mmol/L 137   POTASSIUM mmol/L 3.6   BUN mg/dL 20   CREATININE mg/dL 1.01   CALCIUM mg/dL 9.1        Results from last 7 days   Lab Units 10/19/20  0910 10/18/20  1745   WBC 10*3/mm3 5.10 7.30   HEMOGLOBIN g/dL 13.3 14.3   HEMATOCRIT % 40.5 42.6   PLATELETS 10*3/mm3 256 265        The following medical decision was discussed in detail with Dr. Yip      Assessment:  1. Acute congestive diastolic heart failure  2. acute respiratory failure with hypoxia   3. atrial fibrillation at home anticoagulated with Eliquis   4. hypertension  5. Diabetes mellitus  6. Dysphagia right-sided hemiplegia history of stroke at home on   Plavix   7. COPD  8. cardiomyopathy  1.  Covid    Plan:  Blood pressure and heart rate are stable.  He is atrial fibrillation  "on the monitor.   It has been decreased by approximately 6 pounds, continue diuretics.  Continue apixaban, atorvastatin, carvedilol, Lasix IV, hydralazine, and lisinopril.    Labs/tests ordered for am: Troponin    Alexandra Yip MD    )10/20/2020       EMR Dragon/Transcription:   \"Dictated utilizing Dragon dictation\".     "

## 2020-10-20 NOTE — PROGRESS NOTES
"Physicians Statement of Medical Necessity for  Ambulance Transportation    GENERAL INFORMATION     Name: Blaze Mayorga  YOB: 1950  Medicare #: see attached facesheet   Transport Date: 10/2020 (Valid for round trips this date, or for scheduled repetitive trips for 60 days from the date signed below.)  Origin: [facility] [id number]  Destination: [facility]   Is the Patient's stay covered under Medicare Part A (PPS/DRG?)Yes   Closest appropriate facility? Yes  If this a hosp-hosp transfer? No  Is this a hospice patient? No    MEDICAL NECESSITY QUESTIONAIRE    Ambulance Transportation is medically necessary only if other means of transportation are contraindicated or would be potentially harmful to the patient.  To meet this requirement, the patient must be either \"bed confined\" or suffer from a condition such that transport by means other than an ambulance is contraindicated by the patient's condition.  The following questions must be answered by the healthcare professional signing below for this form to be valid:     1) Describe the MEDICAL CONDITION (physical and/or mental) of this patient AT THE TIME OF AMBULANCE TRANSPORT that requires the patient to be transported in an ambulance, and why transport by other means is contraindicated by the patient's condition:   Past Medical History:   Diagnosis Date   • A-fib (CMS/HCC)    • Aphasia    • Apraxia    • BPH (benign prostatic hyperplasia)    • COPD (chronic obstructive pulmonary disease) (CMS/HCC)    • Diabetes mellitus (CMS/HCC)    • Dysphagia    • Hemiplegia (CMS/HCC)     Right side   • Hypertension    • Major depressive disorder    • Neuromuscular dysfunction of bladder, unspecified    • Stroke (CMS/HCC) 06/02/2020   • Subarachnoid hemorrhage (CMS/HCC)       Past Surgical History:   Procedure Laterality Date   • PEG TUBE INSERTION        2) Is this patient \"bed confined\" as defined below?Yes    To be \"bed confined\" the patient must satisfy all three of " the following criteria:  (1) unable to get up from bed without assistance; AND (2) unable to ambulate;  AND (3) unable to sit in a chair or wheelchair.  3) Can this patient safely be transported by car or wheelchair van (I.e., may safely sit during transport, without an attendant or monitoring?)No   4. In addition to completing questions 1-3 above, please check any of the following conditions that apply*:          *Note: supporting documentation for any boxes checked must be maintained in the patient's medical records Medical attendant required, Special handling/isolation/infection control precautions required and Unable to tolerate seated position for time needed to transport      SIGNATURE OF PHYSICIAN OR OTHER AUTHORIZED HEALTHCARE PROFESSIONAL    I certify that the above information is true and correct based on my evaluation of this patient, and represent that the patient requires transport by ambulance and that other forms of transport are contraindicated.  I understand that this information will be used by the Centers for Medicare and Medicaid Services (CMS) to support the determiniation of medical necessity for ambulance services, and I represent that I have personal knowledge of the patient's condition at the time of transport.       If this box is checked, I also certify that the patient is physically or mentally incapable of signing the ambulance service's claim form and that the institution with which I am affiliated has furnished care, services or assistance to the patient.  My signature below is made on behalf of the patient pursuant to 42 .36(b)(4). In accordance with 42 .37, the specific reason(s) that the patient is physically or mentally incapable of signing the claim for is as follows:     Signature of Physician or Healthcare Professional  Date/Time:   10/2020      (For Scheduled repetitive transport, this form is not valid for transports performed more than 60 days after this  date).                                                                                                                                            --------------------------------------------------------------------------------------------  Printed Name and Credentials of Physician or Authorized Healthcare Professional     *Form must be signed by patient's attending physician for scheduled, repetitive transports,.  For non-repetitive ambulance transports, if unable to obtain the signature of the attending physician, any of the following may sign (please select below):     Physician  Clinical Nurse Specialist  Registered Nurse     Physician Assistant  Discharge Planner  Licensed Practical Nurse     Nurse Practitioner

## 2020-10-20 NOTE — PROGRESS NOTES
Name: Blaze Mayorga ADMIT: 10/18/2020   : 1950  PCP: Anjel Nguyen MD    MRN: [id number] LOS: 2 days   AGE/SEX: 70 y.o. male  ROOM: [facility]   Subjective   Chief Complaint   Patient presents with   • Shortness of Breath     86 % RA at SNF  on supplemental oxygen yesterday  Room air today    History of stroke    On lasix    ROS  unreliable due to his h/o cva    Objective   Vital Signs  Temp:  [98.1 °F (36.7 °C)-99.8 °F (37.7 °C)] 99.8 °F (37.7 °C)  Heart Rate:  [54-68] 63  Resp:  [18] 18  BP: (112-134)/(65-92) 112/83  SpO2:  [92 %-100 %] 97 %  on   ;   Device (Oxygen Therapy): room air  Body mass index is 28.17 kg/m².    Elderly, chronically ill  Some aphasia and difficulty communicating  Physical Exam  HENT:      Head: Normocephalic and atraumatic.   Eyes:      General: No scleral icterus.  Neck:      Musculoskeletal: Neck supple.   Cardiovascular:      Rate and Rhythm: Normal rate and regular rhythm.      Heart sounds: Normal heart sounds.   Pulmonary:      Effort: No respiratory distress.      Breath sounds: Normal breath sounds.   Abdominal:      General: There is no distension.      Palpations: Abdomen is soft.      Tenderness: There is no abdominal tenderness.   Neurological:      Mental Status: He is alert.          Results Review:       I reviewed the patient's new clinical results.  Results from last 7 days   Lab Units 10/19/20  0910 10/18/20  1745   WBC 10*3/mm3 5.10 7.30   HEMOGLOBIN g/dL 13.3 14.3   PLATELETS 10*3/mm3 256 265     Results from last 7 days   Lab Units 10/20/20  0838 10/19/20  0910 10/18/20  1745   SODIUM mmol/L 137 140 138   POTASSIUM mmol/L 3.6 3.6 4.3   CHLORIDE mmol/L 101 104 103   CO2 mmol/L 23.4 23.2 26.4   BUN mg/dL 20 16 20   CREATININE mg/dL 1.01 1.01 0.98   GLUCOSE mg/dL 145* 104* 114*   Estimated Creatinine Clearance: 72.9 mL/min (by C-G formula based on SCr of 1.01 mg/dL).  Results from last 7 days   Lab Units 10/19/20  0910 10/18/20  1745   ALBUMIN g/dL 4.00 4.30    BILIRUBIN mg/dL 1.3* 1.1   ALK PHOS U/L 134* 153*   AST (SGOT) U/L 15 16   ALT (SGPT) U/L 24 32     Results from last 7 days   Lab Units 10/20/20  0838 10/19/20  0910 10/18/20  1745   CALCIUM mg/dL 9.1 9.4 9.8   ALBUMIN g/dL  --  4.00 4.30   MAGNESIUM mg/dL 2.2  --   --      Results from last 7 days   Lab Units 10/19/20  0910 10/18/20  1745   PROCALCITONIN ng/mL 0.08 0.06   LACTATE mmol/L 1.5 1.7       Coag     HbA1C No results found for: HGBA1C  Infection   Results from last 7 days   Lab Units 10/19/20  0910 10/18/20  1745   PROCALCITONIN ng/mL 0.08 0.06     Radiology(recent) Xr Chest 1 View    Result Date: 10/18/2020  FINDINGS consistent with congestive heart failure. There could be an element of pneumonia in the right lung base. Please correlate. 2. Follow-up films recommended.  This report was finalized on 10/18/2020 6:30 PM by Dr. Moshe Terry M.D.      Troponin T   Date Value Ref Range Status   10/20/2020 0.023 0.000 - 0.030 ng/mL Final     No components found for: TSH;2    amantadine, 100 mg, Per G Tube, Daily With Lunch  amantadine, 200 mg, Per G Tube, QAM  apixaban, 5 mg, Per G Tube, Q12H  atorvastatin, 40 mg, Per G Tube, Nightly  carvedilol, 6.25 mg, Oral, BID With Meals  clopidogrel, 75 mg, Per G Tube, Daily  dexamethasone, 6 mg, Oral, Daily With Breakfast  FLUoxetine, 20 mg, Per G Tube, Daily  furosemide, 40 mg, Intravenous, Daily  hydrALAZINE, 50 mg, Per G Tube, TID  insulin lispro, 0-14 Units, Subcutaneous, TID AC  lisinopril, 20 mg, Per G Tube, Daily  methylphenidate, 5 mg, Oral, BID With Meals  nitroglycerin, 1 inch, Topical, Q6H  pantoprazole, 40 mg, Oral, Daily  sennosides-docusate, 1 tablet, Per G Tube, Daily  sodium chloride, 10 mL, Intravenous, Q12H  tamsulosin, 0.4 mg, Oral, Nightly  tiZANidine, 2 mg, Per G Tube, TID       Diet Soft Texture; Chopped; Cardiac, Consistent Carbohydrate      Assessment/Plan      Active Hospital Problems    Diagnosis  POA   • **Pneumonia due to COVID-19 virus  [U07.1, J12.89]  Yes   • Cardiomyopathy (CMS/HCC) [I42.9]  Yes   • Type 2 diabetes mellitus with hyperglycemia (CMS/HCC) [E11.65]  Yes   • Hypertension [I10]  Yes   • Dysphagia [R13.10]  Yes   • A-fib (CMS/HCC) [I48.91]  Yes   • Acute on chronic combined systolic and diastolic congestive heart failure (CMS/HCC) [I50.43]  Yes   • Hypoxia [R09.02]  Yes      Resolved Hospital Problems   No resolved problems to display.       · Supportive care for covid including oxygen if needed. With hypoxia started dexamethasone 6mg. On RA today  · Given lasix and nitropaste. On a/c as well. Cardiology following. EF is decreased. He is already on coreg and lisinopril. Will see what Cardiology thinks- with his COVID I woudn't think would do any stress test or heart cath at this time but perhaps follow up in the office.   · H/o stroke with hemiplegia  · Restarted home BP meds  · On TFs  · Added SSI      DW RN  DW Daughter on the phone  Greater than 36 minutes spent with greater than 50% counseling and coordinating care    Reviewed previous records    Jeff Jimenez MD  [facility]  10/20/20  10:50 EDT

## 2020-10-20 NOTE — THERAPY EVALUATION
Acute Care - Speech Language Pathology   Swallow Initial Evaluation [facility]     Patient Name: Blaze Mayorga  : 1950  MRN: [id number]  Today's Date: 10/20/2020               Admit Date: 10/18/2020    Visit Dx:     ICD-10-CM ICD-9-CM   1. Acute congestive heart failure, unspecified heart failure type (CMS/[facility])  I50.9 428.0   2. Acute respiratory failure with hypoxia (CMS/[facility])  J96.01 518.81     Patient Active Problem List   Diagnosis   • Acute on chronic diastolic congestive heart failure (CMS/[facility])   • Pneumonia due to COVID-19 virus   • Hypoxia   • Type 2 diabetes mellitus with hyperglycemia (CMS/[facility])   • Hypertension   • Dysphagia   • A-fib (CMS/[facility])     Past Medical History:   Diagnosis Date   • A-fib (CMS/[facility])    • Aphasia    • Apraxia    • BPH (benign prostatic hyperplasia)    • COPD (chronic obstructive pulmonary disease) (CMS/[facility])    • Diabetes mellitus (CMS/[facility])    • Dysphagia    • Hemiplegia (CMS/[facility])     Right side   • Hypertension    • Major depressive disorder    • Neuromuscular dysfunction of bladder, unspecified    • Stroke (CMS/[facility]) 2020   • Subarachnoid hemorrhage (CMS/[facility])      Past Surgical History:   Procedure Laterality Date   • PEG TUBE INSERTION       Patient was not wearing a face mask during this therapy encounter per swallow evaluation performed. Therapist used appropriate personal protective equipment including gown, eye protection, hair cover, mask and gloves.  Mask used was N95/duckbill. Appropriate PPE was worn during the entire therapy session. The patient coughed during this evaluation. Therapist was within 6 feet for 15 minutes or more during the evaluation. Hand hygiene was completed before and after therapy session. Patient is in enhanced droplet precautions.        SWALLOW EVALUATION (last 72 hours)      SLP Adult Swallow Evaluation     Row Name 10/20/20 1200       Rehab Evaluation    Document Type  evaluation  -AB    Subjective Information  no complaints  -AB     Patient Observations  alert;agree to therapy  -AB    Patient/Family/Caregiver Comments/Observations  Pt primarily utilizing nonverbal gestures sucn as facial affect, head nod/shake, and eye movements secondary to aphasia. Able to follow 50-75% of commands and remains awake/alert throughout. Minimal verbalizations are noted, with decreased intensity.   -AB    Care Plan Review  evaluation/treatment results reviewed  -AB    Patient Effort  good  -AB       General Information    Patient Profile Reviewed  yes  -AB    Pertinent History Of Current Problem  70 y.o. male PMH CVA w/dx dysphagia, aphasia, apraxia. Pt currently receiving rehab services through [facility]. PEG tube present, but [facility].   -AB    Current Method of Nutrition  regular textures;thin liquids;gastrostomy feedings  -AB    Precautions/Limitations, Vision  WFL;for purposes of eval  -AB    Precautions/Limitations, Hearing  WFL;for purposes of eval  -AB    Prior Level of Function-Communication  expressive language impairment;cognitive-linguistic impairment  -AB    Prior Level of Function-Swallowing  unknown no information available   -AB    Plans/Goals Discussed with  patient;agreed upon  -AB    Barriers to Rehab  medically complex;previous functional deficit  -AB       Pain    Additional Documentation  Pain Scale: FACES Pre/Post-Treatment (Group)  -AB       Pain Scale: FACES Pre/Post-Treatment    Pain: FACES Scale, Pretreatment  0-->no hurt  -AB    Posttreatment Pain Rating  0-->no hurt  -AB       Oral Motor Structure and Function    Oral Lesions or Structural Abnormalities and/or variants  None  -AB    Dentition Assessment  upper dentures/partial in place;natural, present and adequate upper dentures, natural lower  -AB    Secretion Management  WNL/WFL  -AB    Mucosal Quality  moist, healthy  -AB    Gag Response  -- DNA  -AB    Volitional Swallow  WFL  -AB    Volitional Cough  -- DNA  -AB       Oral Musculature and Cranial  "Nerve Assessment    Oral Motor General Assessment  oral labial or buccal impairment;generalized oral motor weakness;unable to assess  -AB    Oral Labial or Buccal Impairment, Detail, Cranial Nerve VII (Facial):  right labial droop  -AB    Oral Motor, Comment  R sided labial weakness, reduced lingual ROM. Assessment limited by difficulty in following commands consistently, defaulting to OM task \"Open your mouth\"   -AB       Clinical Swallow Eval    Clinical Swallow Evaluation Summary  Bedside swallow evaluation completed with: ice chips, thin liquid by tsp, straw, NTL by tsp, straw, puree, mechanical soft, and regular solids. Despite labial droop, no anterior loss is noted forming adequate seal with spoon and straw presentations. A-P transport is timely without siginficant pocketing. Mastication is mildly prolonged but functional. Swallow palpated, and appears timely without overt s/s aspiration. Pt cued to cough and is unable, but can elicit a productive throat clear. No significant difference in timing, post prandial recations, are noted with thin versus NTL.  -AB       Clinical Impression    Functional Impact  risk of aspiration/pneumonia  -AB    Rehab Potential/Prognosis, Swallowing  good, to achieve stated therapy goals  -AB    Swallow Criteria for Skilled Therapeutic Interventions Met  demonstrates skilled criteria  -AB       Recommendations    Therapy Frequency (Swallow)  PRN  -AB    Predicted Duration Therapy Intervention (Days)  until discharge  -AB    SLP Diet Recommendation  chopped;thin liquids  -AB    Recommended Precautions and Strategies  upright posture during/after eating;small bites of food and sips of liquid;assist with feeding  -AB    Oral Care Recommendations  Oral Care BID/PRN  -AB    SLP Rec. for Method of Medication Administration  meds whole;with thin liquids;with pudding or applesauce;as tolerated  -AB    Monitor for Signs of Aspiration  yes;notify SLP if any concerns  -AB    Anticipated " Discharge Disposition (SLP)  inpatient rehabilitation facility;anticipate therapy at next level of care  -AB       Swallow Goals (SLP)    Oral Nutrition/Hydration Goal Selection (SLP)  oral nutrition/hydration, SLP goal 1;oral nutrition/hydration, SLP goal 2  -AB       Oral Nutrition/Hydration Goal 1 (SLP)    Oral Nutrition/Hydration Goal 1, SLP  Will tolerate L/R diet without oral stage difficulties or complications associated with aspiration  -AB    Time Frame (Oral Nutrition/Hydration Goal 1, SLP)  by discharge  -AB       Oral Nutrition/Hydration Goal 2 (SLP)    Oral Nutrition/Hydration Goal 2, SLP  Will participate in re-evaluation of swallow as indicated  -AB    Time Frame (Oral Nutrition/Hydration Goal 2, SLP)  by discharge  -AB      User Key  (r) = Recorded By, (t) = Taken By, (c) = Cosigned By    Initials Name Effective Dates    Dari Hanna, MS CCC-SLP 10/05/20 -           EDUCATION  The patient has been educated in the following areas:   Dysphagia (Swallowing Impairment) Modified Diet Instruction.    SLP Recommendation and Plan  SLP Swallowing Diagnosis: mild, oral dysphagia  SLP Diet Recommendation: chopped, thin liquids  Recommended Precautions and Strategies: upright posture during/after eating, small bites of food and sips of liquid, assist with feeding  SLP Rec. for Method of Medication Administration: meds whole, with thin liquids, with pudding or applesauce, as tolerated     Monitor for Signs of Aspiration: yes, notify SLP if any concerns     Swallow Criteria for Skilled Therapeutic Interventions Met: demonstrates skilled criteria  Anticipated Discharge Disposition (SLP): inpatient rehabilitation facility, anticipate therapy at next level of care  Rehab Potential/Prognosis, Swallowing: good, to achieve stated therapy goals  Therapy Frequency (Swallow): PRN  Predicted Duration Therapy Intervention (Days): until discharge                         Plan of Care Reviewed With: patient  Progress: no  change  Outcome Summary: Bedside swallow evaluation performed. No overt s/s aspiration. Slow mastication of soft solids. Overall impression mild oral dysphagia. Concerns exist secondary to CXR w/questionable pna at R lung base, which may be compounded by pt current dx COVID. Recommend: mechanical soft solids, thin liquids; medications whole in puree or with thin liquids as tolerated; compensations to include: upright for all PO, small bites/sips, assist feeding. SLP to continue to follow as indicated.    SLP GOALS     Row Name 10/20/20 1200          Oral Nutrition/Hydration Goal 1, SLP  Will tolerate L/R diet without oral stage difficulties or complications associated with aspiration  -AB    Time Frame (Oral Nutrition/Hydration Goal 1, SLP)  by discharge  -AB          Oral Nutrition/Hydration Goal 2, SLP  Will participate in re-evaluation of swallow as indicated  -AB    Time Frame (Oral Nutrition/Hydration Goal 2, SLP)  by discharge  -AB      User Key  (r) = Recorded By, (t) = Taken By, (c) = Cosigned By    Initials Name Provider Type    AB Dari De La Torre, MS CCC-SLP Speech and Language Pathologist             Time Calculation:       Therapy Charges for Today     Code Description Service Date Service Provider Modifiers Qty    [id number]  ST EVAL ORAL PHARYNG SWALLOW 4 10/20/2020 Dari De La Torre, MS CCC-SLP GN 1               Dari De La Torre MS CCC-SLP  10/20/2020

## 2020-10-21 VITALS
OXYGEN SATURATION: 91 % | WEIGHT: 180.8 LBS | DIASTOLIC BLOOD PRESSURE: 87 MMHG | HEART RATE: 60 BPM | TEMPERATURE: 97 F | SYSTOLIC BLOOD PRESSURE: 118 MMHG | BODY MASS INDEX: 27.4 KG/M2 | HEIGHT: 68 IN | RESPIRATION RATE: 16 BRPM

## 2020-10-21 PROBLEM — Z86.73 HISTORY OF STROKE: Status: ACTIVE | Noted: 2020-10-21

## 2020-10-21 LAB
ANION GAP SERPL CALCULATED.3IONS-SCNC: 8.4 MMOL/L (ref 5–15)
BUN SERPL-MCNC: 23 MG/DL (ref 8–23)
BUN/CREAT SERPL: 27.1 (ref 7–25)
CALCIUM SPEC-SCNC: 8.5 MG/DL (ref 8.6–10.5)
CHLORIDE SERPL-SCNC: 101 MMOL/L (ref 98–107)
CK SERPL-CCNC: 35 U/L (ref 20–200)
CO2 SERPL-SCNC: 24.6 MMOL/L (ref 22–29)
CREAT SERPL-MCNC: 0.85 MG/DL (ref 0.76–1.27)
CRP SERPL-MCNC: 3.66 MG/DL (ref 0–0.5)
FERRITIN SERPL-MCNC: 410 NG/ML (ref 30–400)
GFR SERPL CREATININE-BSD FRML MDRD: 108 ML/MIN/1.73
GLUCOSE BLDC GLUCOMTR-MCNC: 136 MG/DL (ref 70–130)
GLUCOSE BLDC GLUCOMTR-MCNC: 151 MG/DL (ref 70–130)
GLUCOSE SERPL-MCNC: 130 MG/DL (ref 65–99)
LDH SERPL-CCNC: 143 U/L (ref 135–225)
POTASSIUM SERPL-SCNC: 3.6 MMOL/L (ref 3.5–5.2)
SODIUM SERPL-SCNC: 134 MMOL/L (ref 136–145)
TROPONIN T SERPL-MCNC: 0.01 NG/ML (ref 0–0.03)

## 2020-10-21 PROCEDURE — 80048 BASIC METABOLIC PNL TOTAL CA: CPT | Performed by: INTERNAL MEDICINE

## 2020-10-21 PROCEDURE — 82550 ASSAY OF CK (CPK): CPT | Performed by: INTERNAL MEDICINE

## 2020-10-21 PROCEDURE — 84484 ASSAY OF TROPONIN QUANT: CPT | Performed by: NURSE PRACTITIONER

## 2020-10-21 PROCEDURE — 25010000002 FUROSEMIDE PER 20 MG: Performed by: INTERNAL MEDICINE

## 2020-10-21 PROCEDURE — 86140 C-REACTIVE PROTEIN: CPT | Performed by: INTERNAL MEDICINE

## 2020-10-21 PROCEDURE — 83615 LACTATE (LD) (LDH) ENZYME: CPT | Performed by: INTERNAL MEDICINE

## 2020-10-21 PROCEDURE — 82962 GLUCOSE BLOOD TEST: CPT

## 2020-10-21 PROCEDURE — 63710000001 DEXAMETHASONE PER 0.25 MG: Performed by: INTERNAL MEDICINE

## 2020-10-21 PROCEDURE — 82728 ASSAY OF FERRITIN: CPT | Performed by: INTERNAL MEDICINE

## 2020-10-21 RX ORDER — METHYLPHENIDATE HYDROCHLORIDE 5 MG/1
5 TABLET ORAL 2 TIMES DAILY
Qty: 15 TABLET | Refills: 0 | Status: SHIPPED | OUTPATIENT
Start: 2020-10-21

## 2020-10-21 RX ORDER — FUROSEMIDE 40 MG/1
40 TABLET ORAL DAILY
Status: DISCONTINUED | OUTPATIENT
Start: 2020-10-22 | End: 2020-10-21 | Stop reason: HOSPADM

## 2020-10-21 RX ORDER — FUROSEMIDE 40 MG/1
40 TABLET ORAL DAILY
Start: 2020-10-22

## 2020-10-21 RX ORDER — DEXAMETHASONE 6 MG/1
6 TABLET ORAL
Qty: 8 TABLET | Refills: 0
Start: 2020-10-22 | End: 2020-10-30

## 2020-10-21 RX ADMIN — PANTOPRAZOLE SODIUM 40 MG: 40 TABLET, DELAYED RELEASE ORAL at 09:29

## 2020-10-21 RX ADMIN — TIZANIDINE 2 MG: 4 TABLET ORAL at 09:29

## 2020-10-21 RX ADMIN — CARVEDILOL 6.25 MG: 6.25 TABLET, FILM COATED ORAL at 09:29

## 2020-10-21 RX ADMIN — SODIUM CHLORIDE, PRESERVATIVE FREE 10 ML: 5 INJECTION INTRAVENOUS at 09:29

## 2020-10-21 RX ADMIN — TIZANIDINE 2 MG: 4 TABLET ORAL at 16:33

## 2020-10-21 RX ADMIN — HYDRALAZINE HYDROCHLORIDE 50 MG: 50 TABLET, FILM COATED ORAL at 09:28

## 2020-10-21 RX ADMIN — AMANTADINE HYDROCHLORIDE 200 MG: 100 CAPSULE ORAL at 06:04

## 2020-10-21 RX ADMIN — DEXAMETHASONE 6 MG: 4 TABLET ORAL at 09:28

## 2020-10-21 RX ADMIN — NITROGLYCERIN 1 INCH: 20 OINTMENT TOPICAL at 06:03

## 2020-10-21 RX ADMIN — LISINOPRIL 20 MG: 20 TABLET ORAL at 09:28

## 2020-10-21 RX ADMIN — NITROGLYCERIN 1 INCH: 20 OINTMENT TOPICAL at 12:53

## 2020-10-21 RX ADMIN — AMANTADINE HYDROCHLORIDE 100 MG: 100 CAPSULE ORAL at 12:53

## 2020-10-21 RX ADMIN — DOCUSATE SODIUM 50MG AND SENNOSIDES 8.6MG 1 TABLET: 8.6; 5 TABLET, FILM COATED ORAL at 09:28

## 2020-10-21 RX ADMIN — APIXABAN 5 MG: 5 TABLET, FILM COATED ORAL at 09:28

## 2020-10-21 RX ADMIN — FUROSEMIDE 40 MG: 10 INJECTION, SOLUTION INTRAMUSCULAR; INTRAVENOUS at 09:27

## 2020-10-21 RX ADMIN — METHYLPHENIDATE HYDROCHLORIDE 5 MG: 5 TABLET ORAL at 09:27

## 2020-10-21 RX ADMIN — HYDRALAZINE HYDROCHLORIDE 50 MG: 50 TABLET, FILM COATED ORAL at 16:33

## 2020-10-21 RX ADMIN — CLOPIDOGREL 75 MG: 75 TABLET, FILM COATED ORAL at 09:28

## 2020-10-21 RX ADMIN — FLUOXETINE 20 MG: 20 SOLUTION ORAL at 09:29

## 2020-10-21 NOTE — PROGRESS NOTES
Case Management Discharge Note      Final Note: dc to [facility] via [facility] @ 1800. Iraida/[facility] notified. Nsg to notify family, packet in cubby.         Selected Continued Care - Admitted Since 10/18/2020     Destination Coordination complete    Service Provider Selected Services Address Phone Fax    [facility]  Skilled Nursing [address] [facility] [address] [phone]          Durable Medical Equipment    No services have been selected for the patient.              Dialysis/Infusion    No services have been selected for the patient.              Home Medical Care    No services have been selected for the patient.              Therapy    No services have been selected for the patient.              Community Resources    No services have been selected for the patient.                       Final Discharge Disposition Code: 03 - skilled nursing facility (SNF)

## 2020-10-21 NOTE — SIGNIFICANT NOTE
DC ordered at 1334, however, delayed to EMS transportation. [facility] arranged yesterday in anticipation of DC today scheduled for 1900. EMS called unit stating they are here so transport requested at 1917

## 2020-10-21 NOTE — PROGRESS NOTES
[facility]  Cardiology Progress Note    Patient Identification:  Name: Blaze Mayorga  Age: 70 y.o.  Sex: male  :  1950  MRN: [id number]                 Follow Up / Chief Complaint: CHF    Interval History: home today       Subjective:       Objective:    Past Medical History:  Past Medical History:   Diagnosis Date   • A-fib (CMS/[facility])    • Aphasia    • Apraxia    • BPH (benign prostatic hyperplasia)    • COPD (chronic obstructive pulmonary disease) (CMS/[facility])    • Diabetes mellitus (CMS/HCC)    • Dysphagia    • Hemiplegia (CMS/HCC)     Right side   • Hypertension    • Major depressive disorder    • Neuromuscular dysfunction of bladder, unspecified    • Stroke (CMS/HCC) 2020   • Subarachnoid hemorrhage (CMS/[facility])      Past Surgical History:  Past Surgical History:   Procedure Laterality Date   • PEG TUBE INSERTION          Social History:   Social History     Tobacco Use   • Smoking status: Never Smoker   • Tobacco comment: aphasic   Substance Use Topics   • Alcohol use: Not on file      Family History:  History reviewed. No pertinent family history.       Allergies:  No Known Allergies  Scheduled Meds:  amantadine, 100 mg, Daily With Lunch  amantadine, 200 mg, QAM  apixaban, 5 mg, Q12H  atorvastatin, 40 mg, Nightly  carvedilol, 6.25 mg, BID With Meals  clopidogrel, 75 mg, Daily  dexamethasone, 6 mg, Daily With Breakfast  FLUoxetine, 20 mg, Daily  [START ON 10/22/2020] furosemide, 40 mg, Daily  hydrALAZINE, 50 mg, TID  insulin lispro, 0-14 Units, TID AC  lisinopril, 20 mg, Daily  methylphenidate, 5 mg, BID With Meals  nitroglycerin, 1 inch, Q6H  pantoprazole, 40 mg, Daily  sennosides-docusate, 1 tablet, Daily  sodium chloride, 10 mL, Q12H  tamsulosin, 0.4 mg, Nightly  tiZANidine, 2 mg, TID            INTAKE AND OUTPUT:    Intake/Output Summary (Last 24 hours) at 10/21/2020 1352  Last data filed at 10/21/2020 1100  Gross per 24 hour   Intake 760 ml   Output --   Net 760 ml       Review of  Systems:   GI:  Cardiac:  Pulmonary:    Constitutional:  Temp:  [96.5 °F (35.8 °C)-98 °F (36.7 °C)] 97 °F (36.1 °C)  Heart Rate:  [51-61] 60  Resp:  [14-16] 16  BP: ()/(52-92) 111/78    Physical Exam:                      Cardiographics  Telemetry:        Chest X-ray:   IMPRESSION:  FINDINGS consistent with congestive heart failure. There  could be an element of pneumonia in the right lung base. Please  Correlate. 2. Follow-up films recommended.     This report was finalized on 10/18/2020 6:30 PM by Dr. Moshe Terry M.D.     ECG:        Echocardiogram:   Imaging  Chest X-ray:   IMPRESSION:  FINDINGS consistent with congestive heart failure. There  could be an element of pneumonia in the right lung base. Please  Correlate. 2. Follow-up films recommended.     This report was finalized on 10/18/2020 6:30 PM by Dr. Moshe Terry M.D.     Lab Review   Results from last 7 days   Lab Units 10/21/20  0604 10/20/20  0838 10/19/20  0910   CK TOTAL U/L 35 43 37   TROPONIN T ng/mL 0.011 0.023 0.017     Results from last 7 days   Lab Units 10/20/20  0838   MAGNESIUM mg/dL 2.2     Results from last 7 days   Lab Units 10/21/20  0604   SODIUM mmol/L 134*   POTASSIUM mmol/L 3.6   BUN mg/dL 23   CREATININE mg/dL 0.85   CALCIUM mg/dL 8.5*        Results from last 7 days   Lab Units 10/19/20  0910 10/18/20  1745   WBC 10*3/mm3 5.10 7.30   HEMOGLOBIN g/dL 13.3 14.3   HEMATOCRIT % 40.5 42.6   PLATELETS 10*3/mm3 256 265        The following medical decision was discussed in detail with Dr. Yip      Assessment:  1. Acute congestive diastolic heart failure  2. acute respiratory failure with hypoxia   3. atrial fibrillation at home anticoagulated with Eliquis   4. hypertension  5. Diabetes mellitus  6. Dysphagia right-sided hemiplegia history of stroke at home on   Plavix   7. COPD  8. cardiomyopathy  1.  Covid    Plan:  Blood pressure stable, HR controlled. Atrial fibrillation on monitor.  Weight has decreased by  "approximately 11 pounds.  Continue apixaban, atorvastatin, carvedilol, Lasix p.o., hydralazine, and lisinopril.  Conservative management.  Cardiovascular stable, for discharge from our standpoint.         He will follow up on November 11th 2020 at 9 AM.  He will follow-up with SUZIE Rodriguez over the [facility] office.     )10/21/2020       EMR Dragon/Transcription:   \"Dictated utilizing Dragon dictation\".     "

## 2020-10-21 NOTE — PLAN OF CARE
Goal Outcome Evaluation:  Plan of Care Reviewed With: patient  Progress: improving  Outcome Summary: VSS, pt slept well during shift, PEG site care completed, pt tolerating diet, plan to DC back to [facility] via [facility] at 1900, CTM

## 2020-10-21 NOTE — DISCHARGE SUMMARY
NAME: Blaze Mayorga ADMIT: 10/18/2020   : 1950  PCP: Anjel Nguyen MD    MRN: [id number] LOS: 3 days   AGE/SEX: 70 y.o. male  ROOM: [facility]     Date of Admission:  10/18/2020  Date of Discharge:  10/21/2020    PCP: Anjel Nguyen MD    CHIEF COMPLAINT  Shortness of Breath      DISCHARGE DIAGNOSIS  Active Hospital Problems    Diagnosis  POA   • **Pneumonia due to COVID-19 virus [U07.1, J12.89]  Yes   • History of stroke [Z86.73]  Unknown   • Cardiomyopathy (CMS/[facility]) [I42.9]  Yes   • Type 2 diabetes mellitus with hyperglycemia (CMS/[facility]) [E11.65]  Yes   • Hypertension [I10]  Yes   • Dysphagia [R13.10]  Yes   • A-fib (CMS/[facility]) [I48.91]  Yes   • Acute on chronic combined systolic and diastolic congestive heart failure (CMS/[facility]) [I50.43]  Yes   • Hypoxia [R09.02]  Yes      Resolved Hospital Problems   No resolved problems to display.       SECONDARY DIAGNOSES  Past Medical History:   Diagnosis Date   • A-fib (CMS/[facility])    • Aphasia    • Apraxia    • BPH (benign prostatic hyperplasia)    • COPD (chronic obstructive pulmonary disease) (CMS/[facility])    • Diabetes mellitus (CMS/[facility])    • Dysphagia    • Hemiplegia (CMS/[facility])     Right side   • Hypertension    • Major depressive disorder    • Neuromuscular dysfunction of bladder, unspecified    • Stroke (CMS/[facility]) 2020   • Subarachnoid hemorrhage (CMS/[facility])        CONSULTS     Cardiology    HOSPITAL COURSE  Patient is a 70-year-old male with history of CVA with right-sided hemiplegia, dysphagia, Afib on a/c and PEG tube placement (though takes a diet during the day and uses the PEG for extra calories at night).    He presented with increasing dyspnea at his nursing facility.  He was noted to have oxygen sats 86% room air at his nursing facility.  He was admitted here for both COVID-19 as well as CHF exacerbation.    Overall his Covid was not too severe.  He was started on dexamethasone due to the previous low pulse ox.  He has been on room air for a few days with  no significant desaturations.  Reasonable to finish the course of dexamethasone and can monitor his situation at his facility.  Even if he needed some intermittent 2 L of oxygen could be reasonable to keep at his nursing facility but again he has been on room air for a few days.    He also was found to be in a CHF exacerbation.  Prior to admission he was on a low-dose of Lasix.  He was given IV Lasix here and was seen by cardiology.  He did have cardiomyopathy/decreased ejection fraction on echo.  He will be continued on Coreg and lisinopril in addition to Lasix and his other medicines at discharge.  He will follow-up with cardiology in 1 month for continued monitoring and to see if any additional testing is planned.     DIAGNOSTICS    2D ECHO 10/19/2020  · Mild aortic valve regurgitation is present.  · Mild to moderate mitral valve regurgitation is present.  · The left ventricular cavity is mildly dilated.  · Left ventricular ejection fraction appears to be 26 - 30%.  · The left atrial cavity is mildly dilated.  · The right atrial cavity is mildly dilated.  · Mild tricuspid valve regurgitation is present.  · Left ventricular ejection fraction appears to be 26 - 30%  · There is no evidence of pericardial effusion. .    /21/2020 0604 10/21/2020 0747 CK [[id number]]   Blood    Final result Component Value Units   Creatine Kinase 35 U/L           10/21/2020 0604 10/21/2020 0829 C-reactive Protein [[id number]]   (Abnormal)   Blood    Final result Component Value Units   C-Reactive Protein 3.66High  mg/dL           10/21/2020 0604 10/21/2020 0748 Ferritin [[id number]]    (Abnormal)   Blood    Final result Component Value Units   Ferritin 410.00High  ng/mL           10/21/2020 0604 10/21/2020 0747 Lactate Dehydrogenase [[id number]]   Blood    Final result Component Value Units    U/L           10/21/2020 0604 10/21/2020 0748 Basic Metabolic Panel [[id number]]    (Abnormal)   Blood    Final result Component Value Units    Glucose 130High  mg/dL   BUN 23 mg/dL   Creatinine 0.85 mg/dL   Sodium 134Low  mmol/L   Potassium 3.6 mmol/L   Chloride 101 mmol/L   CO2 24.6 mmol/L   Calcium 8.5Low  mg/dL   eGFR African Am 108 mL/min/1.73   BUN/Creatinine Ratio 27.1High     Anion Gap 8.4 mmol/L        10/18/2020 1736 10/18/2020 1926 Respiratory Panel PCR w/COVID-19(SARS-CoV-2) [facility] In-House, NP Swab in UTM/VTM, 3-4 HR TAT - Swab, Nasopharynx [[id number]]    (Abnormal)   Swab from Nasopharynx    Final result Component Value   ADENOVIRUS, PCR Not Detected   Coronavirus 229E Not Detected   Coronavirus HKU1 Not Detected   Coronavirus NL63 Not Detected   Coronavirus OC43 Not Detected   COVID19 DetectedCritical    Human Metapneumovirus Not Detected   Human Rhinovirus/Enterovirus Not Detected   Influenza A PCR Not Detected   Influenza A H1 Not Detected   Influenza A H1 2009 PCR Not Detected   Influenza A H3 Not Detected   Influenza B PCR Not Detected   Parainfluenza Virus 1 Not Detected   Parainfluenza Virus 2 Not Detected   Parainfluenza Virus 3 Not Detected   Parainfluenza Virus 4 Not Detected   RSV, PCR Not Detected   Bordetella pertussis pcr Not Detected   Bordetella parapertussis PCR Not Detected   Chlamydophila pneumoniae PCR Not Detected   Mycoplasma pneumo by PCR Not Detected        XR Chest 1 View [[id number]] Jus as Reviewed   Order Status: Completed Collected: 10/18/20 1829    Updated: 10/18/20 1833   Narrative:     XR CHEST 1 VW-  10/18/2020       HISTORY: Shortness of breath.       Heart size is mildly enlarged. There is bilateral vascular congestion   with some mild interstitial fluid. More confluent increased density is   seen in the right lung base which may represent edema and/or pneumonia.       Small metallic density seen overlying the right mid hemithorax.   Calcified left hilar lymph node is seen.       No pneumothorax is seen.               Impression:     FINDINGS consistent with congestive heart failure. There    could be an element of pneumonia in the right lung base. Please   correlate.   2. Follow-up films recommended.          PHYSICAL EXAM  Objective    Alert  nad  No resp distress  Lungs fairly clear    CONDITION ON DISCHARGE  Stable.      DISCHARGE DISPOSITION   Skilled Nursing Facility (DC - External)      DISCHARGE MEDICATIONS       Your medication list      START taking these medications      Instructions Last Dose Given Next Dose Due   dexamethasone 6 MG tablet  Commonly known as: DECADRON  Start taking on: October 22, 2020      Take 1 tablet by mouth Daily With Breakfast for 8 doses.          CHANGE how you take these medications      Instructions Last Dose Given Next Dose Due   furosemide 40 MG tablet  Commonly known as: LASIX  Start taking on: October 22, 2020  What changed:   · medication strength  · how much to take  · how to take this      Take 1 tablet by mouth Daily.       methylphenidate 5 MG tablet  Commonly known as: RITALIN  What changed: when to take this      Take 1 tablet by mouth 2 (Two) Times a Day.          CONTINUE taking these medications      Instructions Last Dose Given Next Dose Due   acetaminophen 325 MG tablet  Commonly known as: TYLENOL      650 mg by Per G Tube route Every 6 (Six) Hours As Needed for Mild Pain .       amantadine 100 MG capsule  Commonly known as: SYMMETREL      150 mg by Per G Tube route Daily With Lunch.       amantadine 100 MG capsule  Commonly known as: SYMMETREL      200 mg by Per G Tube route Every Morning.       apixaban 5 MG tablet tablet  Commonly known as: ELIQUIS      5 mg by Per G Tube route 2 (Two) Times a Day.       atorvastatin 40 MG tablet  Commonly known as: LIPITOR      40 mg by Per G Tube route Every Night.       carvedilol 6.25 MG tablet  Commonly known as: COREG      6.25 mg by Per G Tube route 2 (Two) Times a Day With Meals.       cloNIDine 0.1 MG tablet  Commonly known as: CATAPRES      0.1 mg by Per G Tube route Every 6 (Six) Hours As Needed for  High Blood Pressure (SBP>170 or DBP>105).       clopidogrel 75 MG tablet  Commonly known as: PLAVIX      75 mg by Per G Tube route Daily.       FLUoxetine 20 MG capsule  Commonly known as: PROzac      20 mg by Per G Tube route Daily.       hydrALAZINE 50 MG tablet  Commonly known as: APRESOLINE      50 mg by Per G Tube route 3 (Three) Times a Day.       lisinopril 20 MG tablet  Commonly known as: PRINIVIL,ZESTRIL      20 mg by Per G Tube route Daily.       magnesium hydroxide 400 MG/5ML suspension  Commonly known as: MILK OF MAGNESIA      Take 30 mL by mouth Daily As Needed for Constipation.       metFORMIN 500 MG tablet  Commonly known as: GLUCOPHAGE      500 mg by Per G Tube route 2 (Two) Times a Day With Meals.       pantoprazole 40 MG EC tablet  Commonly known as: PROTONIX      Take 40 mg by mouth Daily.       sennosides-docusate 8.6-50 MG per tablet  Commonly known as: PERICOLACE      1 tablet by Per G Tube route Daily.       tamsulosin 0.4 MG capsule 24 hr capsule  Commonly known as: FLOMAX      Take 1 capsule by mouth Daily.       tiZANidine 4 MG tablet  Commonly known as: ZANAFLEX      2 mg by Per G Tube route 3 (Three) Times a Day.       Vitamin D2 50 MCG (2000 UT) tablet      Take 50,000 Units by mouth 1 (One) Time Per Week. Q Sunday             Where to Get Your Medications      You can get these medications from any pharmacy    Bring a paper prescription for each of these medications  · methylphenidate 5 MG tablet     Information about where to get these medications is not yet available    Ask your nurse or doctor about these medications  · dexamethasone 6 MG tablet  · furosemide 40 MG tablet        No future appointments.  Additional Instructions for the Follow-ups that You Need to Schedule     Discharge Follow-up with Specialty: Physician at [facility] this week, Cardiology in 1 month   As directed      Specialty: Physician at [facility] this week, Cardiology in 1 month            Contact information for follow-up  providers     Anjel Nguyen MD .    Specialty: Physical Medicine and Rehabilitation  Contact information:  [address]  [facility] [address]  [phone]                   Contact information for after-discharge care     Destination     [facility] .    Service: Skilled Nursing  Contact information:  [address] [facility] [address] [phone]                             TEST  RESULTS PENDING AT DISCHARGE         Jeff Jimenez MD  [facility]  10/21/20  13:34 EDT      Time: greater than 32 minutes on discharge  It was a pleasure taking care of this patient while in the hospital.

## 2020-10-21 NOTE — PLAN OF CARE
Goal Outcome Evaluation:  Plan of Care Reviewed With: patient  Progress: no change   Has remained on room air this shift. No S/S distress. Afib monitor.